# Patient Record
Sex: FEMALE | Race: WHITE | NOT HISPANIC OR LATINO | Employment: STUDENT | URBAN - NONMETROPOLITAN AREA
[De-identification: names, ages, dates, MRNs, and addresses within clinical notes are randomized per-mention and may not be internally consistent; named-entity substitution may affect disease eponyms.]

---
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
## 2020-04-06 ENCOUNTER — TELEPHONE (OUTPATIENT)
Dept: NEUROLOGY | Facility: CLINIC | Age: 20
End: 2020-04-06

## 2020-04-06 ENCOUNTER — TELEMEDICINE (OUTPATIENT)
Dept: INTERNAL MEDICINE | Facility: CLINIC | Age: 20
End: 2020-04-06

## 2020-04-06 DIAGNOSIS — R20.0 BILATERAL LEG NUMBNESS: Primary | ICD-10-CM

## 2020-04-06 DIAGNOSIS — G93.9 WHITE MATTER LESION OF CENTRAL NERVOUS SYSTEM: ICD-10-CM

## 2020-04-06 DIAGNOSIS — G95.9 SPINAL CORD LESION (HCC): ICD-10-CM

## 2020-04-06 DIAGNOSIS — R51.9 NEW ONSET OF HEADACHES: ICD-10-CM

## 2020-04-06 PROBLEM — N80.9 ENDOMETRIOSIS: Status: ACTIVE | Noted: 2020-04-06

## 2020-04-06 PROBLEM — E28.2 PCO (POLYCYSTIC OVARIES): Status: ACTIVE | Noted: 2020-04-06

## 2020-04-06 PROCEDURE — 99214 OFFICE O/P EST MOD 30 MIN: CPT | Performed by: PHYSICIAN ASSISTANT

## 2020-04-06 RX ORDER — HYDROCORTISONE ACETATE SUPPOSITORY 30 MG/1
SUPPOSITORY RECTAL
COMMUNITY
Start: 2020-01-30 | End: 2020-11-30

## 2020-04-06 RX ORDER — SUMATRIPTAN 50 MG/1
TABLET, FILM COATED ORAL
Qty: 9 TABLET | Refills: 1 | Status: SHIPPED | OUTPATIENT
Start: 2020-04-06 | End: 2020-11-12

## 2020-04-06 RX ORDER — PROPRANOLOL HYDROCHLORIDE 10 MG/1
TABLET ORAL
COMMUNITY
Start: 2020-01-25 | End: 2021-07-14 | Stop reason: SDUPTHER

## 2020-04-06 RX ORDER — TRAZODONE HYDROCHLORIDE 100 MG/1
100 TABLET ORAL
COMMUNITY
Start: 2020-01-25 | End: 2020-11-30

## 2020-04-06 RX ORDER — ONDANSETRON 8 MG/1
8 TABLET, ORALLY DISINTEGRATING ORAL EVERY 8 HOURS PRN
Qty: 12 TABLET | Refills: 1 | Status: SHIPPED | OUTPATIENT
Start: 2020-04-06 | End: 2020-11-30

## 2020-04-06 RX ORDER — LORAZEPAM 1 MG/1
TABLET ORAL
COMMUNITY
Start: 2020-01-08 | End: 2020-05-19

## 2020-04-06 NOTE — PATIENT INSTRUCTIONS
An urgent referral to neurology was ordered today.  If you have not heard from the neurology office within 4 business days please notify me.  I have sent in a prescription for Imitrex which is a migraine abortive medication which you may try to see if it will help headaches when they occur.  He may continue ibuprofen or Excedrin as needed for headaches.  I have also sent in a prescription for Zofran to help with nausea when headaches occur.    If any symptoms worsen you should return to the ER for further evaluation.

## 2020-04-06 NOTE — PROGRESS NOTES
Trisha Payne is a 20 y.o. female.     Subjective   History of Present Illness   Patient presents today with concern of new onset severe headaches.  She notes that she wakes up around 5 days/week with an intense headache which causes vomiting.  These headaches last anywhere from 2 to 7 hours before resolving.  With the headaches she has associated light sensitivity.  With the headaches she has pain from both shoulders up the back of her neck and head with intense pressure behind both eyes.  Movement exacerbates the pain.  She has tried taking ibuprofen and Excedrin for symptoms but notes that only Excedrin extra strength dulls the headache somewhat.  Vomiting sometimes helps resolve the headaches but sometimes causes worsened pain.  As noted below, she had been experiencing fairly persistent bilateral leg numbness which she reports improved significantly around 2 months ago and now only occurs on occasion to a more mild extent.    On 12/9/2019 she presented to  ED with complaint of bilateral leg numbness and tingling which began around 2-3 weeks prior with progressive worsening.  The numbness and tingling are bothersome from the feet to the hips and in a sending fashion without any motor weakness.  In the ER she was noted to have decreased sensation from the hips to the feet bilaterally.  Otherwise, neurological exam was normal.  CBC and BMP in the ER were unremarkable.  In the ER she was evaluated by neurology where it was again noted that she had decreased sensation in bilateral lower extremities.  She was already scheduled for a follow-up with [facility], therefore was deemed appropriate for discharge to home with close outpatient follow-up.      On 12/26/2019 she was seen by outpatient neurology where she described similar symptoms ongoing at that time for around 1 month with eventual a sending pattern up to above the umbilicus.  She continued to deny any motor, bowel or bladder  "symptoms, palpitations, anhidrosis or hyperhidrosis, or breathing problems.  She also denied any recent history of fever, vaccinations, trauma, skin rashes, recent travel or any other reported contributing symptoms/history.  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  She was again noted to have abnormal sensation to light touch and pinprick predominantly in the L5-S1 distribution as well as abnormality to light touch that seems to extend around the T7 level.  She also had symptoms consistent with allodynia.  Differential diagnosis from this encounter included multiple sclerosis/NMOSD.  MRI brain and cervical spine with and without contrast were ordered.  Lumbar puncture was discussed but declined by the patient at that time.  If MRI brain and cervical spine are negative they plan to proceed with CT abdomen and pelvis as well as CT chest to screen for sarcoidosis.  A lumbar puncture may be needed if all are negative.  Labs were also ordered.     -MRI thoracic spine showed: \"Hyperdense lesion in the T7/8 area and is possibly located centrally raising suspicion for a short segment NMOSD\".   -MRI brain performed on 1/9/2020 showed: \"Tiny T2 hyperdense lesion in the periventricular white matter adjacent to the body of the right lateral ventricle.  This may be due to reactive change from an adjacent developmental venous anomaly, although a demyelinating lesion is possible.  However, no additional findings are present to suggest demyelinating disease.\"  -MRI cervical spine performed on 1/9/2020 showed: \" Normal cervical spine\".  -Lab work-up obtained thus far has been unremarkable.     She was last told by the MS specialist at  that they would repeat MRIs in around 6 months to assess for stability.  She did not feel as though that specialist was sent to " her and did not agree with their plan and requests referral to a neurologist within the [facility] for second opinion.        The following portions of the patient's history were reviewed and updated as appropriate: allergies, current medications, past family history, past medical history, past social history, past surgical history and problem list.    Review of Systems   Constitutional: Negative for activity change, appetite change, chills, diaphoresis, fatigue, fever and unexpected weight change.   Eyes: Negative for redness and visual disturbance.   Respiratory: Negative for cough, chest tightness, shortness of breath and wheezing.    Cardiovascular: Negative for chest pain, palpitations and leg swelling.   Gastrointestinal: Positive for nausea and vomiting. Negative for abdominal distention, abdominal pain, blood in stool, constipation, diarrhea and rectal pain.   Endocrine: Negative for cold intolerance, heat intolerance, polydipsia, polyphagia and polyuria.   Genitourinary: Negative.    Musculoskeletal: Positive for myalgias and neck pain. Negative for arthralgias, gait problem, joint swelling and neck stiffness.   Skin: Negative for color change and rash.   Allergic/Immunologic: Negative for immunocompromised state.   Neurological: Positive for numbness and headaches. Negative for dizziness, syncope, facial asymmetry, speech difficulty and weakness.   Psychiatric/Behavioral: Positive for sleep disturbance (headaches). Negative for agitation, confusion, dysphoric mood, self-injury and suicidal ideas. The patient is not nervous/anxious and is not hyperactive.    All other systems reviewed and are negative.        Objective    Physical Exam   Constitutional: She is oriented to person, place, and time. She appears well-developed and well-nourished. No distress.   HENT:   Head: Normocephalic and atraumatic.   Pulmonary/Chest: Effort normal.   Neurological: She is alert and oriented to person, place, and  time. Coordination normal.   Skin: She is not diaphoretic.   Psychiatric: She has a normal mood and affect. Her behavior is normal. Judgment and thought content normal.         There were no vitals taken for this visit.    Nursing note and vitals reviewed.          Assessment/Plan   Trisha was seen today for headache.    Diagnoses and all orders for this visit:    Bilateral leg numbness  -     Ambulatory Referral to Neurology    New onset of headaches  -     Ambulatory Referral to Neurology  -     SUMAtriptan (Imitrex) 50 MG tablet; Take one tablet at onset of headache. May repeat dose one time in 2 hours if headache not relieved.  -     ondansetron ODT (Zofran ODT) 8 MG disintegrating tablet; Place 1 tablet on the tongue Every 8 (Eight) Hours As Needed for Nausea or Vomiting.    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  If you have not heard from the neurology office within 4 business days please notify me.  I have sent in a prescription for Imitrex which is a migraine abortive medication which you may try to see if it will help headaches when they occur.  He may continue ibuprofen or Excedrin as needed for headaches.  I have also sent in a prescription for Zofran to help with nausea when headaches occur.  If any symptoms worsen you should return to the ER for further evaluation.        ER record and neurology records reviewed.        ALESHIA Walls  04/06/2020  8:53 AM

## 2020-04-06 NOTE — TELEPHONE ENCOUNTER
is scheduled to see  on 5/19/2020 she is wondering if she may do a [employer] video visit so that she may see  sooner.

## 2020-05-19 ENCOUNTER — TELEPHONE (OUTPATIENT)
Dept: NEUROLOGY | Facility: CLINIC | Age: 20
End: 2020-05-19

## 2020-05-19 ENCOUNTER — LAB (OUTPATIENT)
Dept: LAB | Facility: HOSPITAL | Age: 20
End: 2020-05-19

## 2020-05-19 ENCOUNTER — OFFICE VISIT (OUTPATIENT)
Dept: NEUROLOGY | Facility: CLINIC | Age: 20
End: 2020-05-19

## 2020-05-19 VITALS
BODY MASS INDEX: 19.81 KG/M2 | WEIGHT: 116 LBS | HEART RATE: 102 BPM | HEIGHT: 64 IN | DIASTOLIC BLOOD PRESSURE: 80 MMHG | SYSTOLIC BLOOD PRESSURE: 123 MMHG | TEMPERATURE: 97.7 F | OXYGEN SATURATION: 98 %

## 2020-05-19 DIAGNOSIS — G37.3 TRANSVERSE MYELITIS (HCC): Primary | ICD-10-CM

## 2020-05-19 DIAGNOSIS — G43.C0 PERIODIC HEADACHE SYNDROME, NOT INTRACTABLE: ICD-10-CM

## 2020-05-19 DIAGNOSIS — G37.3 TRANSVERSE MYELITIS (HCC): ICD-10-CM

## 2020-05-19 PROCEDURE — 86255 FLUORESCENT ANTIBODY SCREEN: CPT

## 2020-05-19 PROCEDURE — 99245 OFF/OP CONSLTJ NEW/EST HI 55: CPT | Performed by: PSYCHIATRY & NEUROLOGY

## 2020-05-19 PROCEDURE — 36415 COLL VENOUS BLD VENIPUNCTURE: CPT

## 2020-05-19 RX ORDER — ALPRAZOLAM 1 MG/1
TABLET ORAL
Qty: 2 TABLET | Refills: 0 | Status: SHIPPED | OUTPATIENT
Start: 2020-05-19 | End: 2020-11-30

## 2020-05-19 RX ORDER — DIVALPROEX SODIUM 500 MG/1
500 TABLET, EXTENDED RELEASE ORAL DAILY
Qty: 30 TABLET | Refills: 5 | Status: SHIPPED | OUTPATIENT
Start: 2020-05-19 | End: 2020-06-09 | Stop reason: SDUPTHER

## 2020-05-19 RX ORDER — ETONOGESTREL AND ETHINYL ESTRADIOL .12; .015 MG/D; MG/D
RING VAGINAL
COMMUNITY
Start: 2020-04-07 | End: 2021-04-08

## 2020-05-19 NOTE — PROGRESS NOTES
Subjective   Patient ID: Trisha Payne is a 20 y.o. female     Chief Complaint   Patient presents with   • Spinal Cord Lesion   • Headache   • Bilateral Leg Numbness   • White Matter Lesion of CNS        History of Present Illness    20 y.o. female referred by Regla Donovan for bilateral LE numbness.  Developed ascending numbness from feet to rib cage over 3 weeks.  Felt slightly clumsy.  Could not feel shoes or pants.  Decreased LT.      Sx resolved by March 2020.  Episodes of N and weakness in LE lasting for a minute at time.  Flexing head or back provokes sx.      Feet developed numbness after being gone for a few weeks.  Recurred with HA.      Migraines    Flashing light in peripheral of OD.  Frequency 8 - 12 a month.  Awakens from sleep.  Last for 5 hours.  Assoc sx of nausea.  Located in forehead and back of nape.  Quality is a tight band.  Started in March 2020.     Taking Sumatriptan but cannot tolerate.       Reviewed medical records:    New onset of B LE numbness starting in 11/2019.  Started in toes and ascended up LE.  L > R sx.  Sensation of walking on sand.  Evaluated at  ED and noted to have decreased sensation from waist down.      My review of films:    MRI thoracic spine - T7/8 cord lesion.    MRI Brain/cervical - 1/9/20 tiny T2 lesion R PVWM probable venous anomaly, no cord lesions      4/6/20 reported HA 5 times a week.  Severe intensity, N, photophobia,     Evaluation by Dr Navarro NMO, SSA/SSB, HIV, RPR, ACE, ANCA neg  Recommended monitoring for second demyelinating lesion.    Started Lyrica. Answers for HPI/ROS submitted by the patient on 5/17/2020   Neurologic complaint  What is the primary reason for your visit?: Neurological Problem      Past Medical History:   Diagnosis Date   • Anemia    • Anxiety    • Bipolar disorder (CMS/HCC)    • Depression    • Ovarian cyst      Family History   Problem Relation Age of Onset   • Hypertension Paternal Grandfather    • Thyroid cancer  Maternal Grandmother    • Mental illness Mother    • Migraines Mother    • Diabetes Father      Social History     Socioeconomic History   • Marital status: Single     Spouse name: Not on file   • Number of children: Not on file   • Years of education: Not on file   • Highest education level: Not on file   Tobacco Use   • Smoking status: Never Smoker   • Smokeless tobacco: Never Used   Substance and Sexual Activity   • Alcohol use: No   • Drug use: No   • Sexual activity: Yes     Partners: Male     Birth control/protection: Condom       Review of Systems   Constitutional: Positive for fatigue. Negative for activity change, diaphoresis, fever and unexpected weight change.   HENT: Negative for tinnitus and trouble swallowing.    Eyes: Negative for photophobia and visual disturbance.   Respiratory: Positive for shortness of breath. Negative for apnea, cough and choking.    Cardiovascular: Positive for chest pain. Negative for palpitations and leg swelling.   Gastrointestinal: Positive for nausea and vomiting. Negative for abdominal pain.   Endocrine: Negative for cold intolerance and heat intolerance.   Genitourinary: Negative for difficulty urinating, frequency, menstrual problem and urgency.   Musculoskeletal: Positive for back pain and neck pain. Negative for gait problem and myalgias.   Skin: Negative for color change and rash.   Allergic/Immunologic: Negative for immunocompromised state.   Neurological: Positive for dizziness, weakness, light-headedness and headaches. Negative for tremors, seizures, syncope, facial asymmetry, speech difficulty and numbness.   Hematological: Negative for adenopathy. Does not bruise/bleed easily.   Psychiatric/Behavioral: Positive for confusion. Negative for behavioral problems, decreased concentration, hallucinations and sleep disturbance.       Objective     Vitals:    05/19/20 1058   BP: 123/80   Pulse: 102   Temp: 97.7 °F (36.5 °C)   SpO2: 98%   Weight: 52.6 kg (116 lb)  "  Height: 162.6 cm (64\")       Neurologic Exam     Mental Status   Oriented to person, place, and time.   Registration: recalls 3 of 3 objects. Recall at 5 minutes: recalls 3 of 3 objects. Follows 3 step commands.   Attention: normal. Concentration: normal.   Speech: speech is normal   Level of consciousness: alert  Knowledge: good and consistent with education.   Able to name object. Able to read. Able to repeat. Able to write. Normal comprehension.     Cranial Nerves     CN II   Visual fields full to confrontation.   Visual acuity: normal  Right visual field deficit: none  Left visual field deficit: none     CN III, IV, VI   Pupils are equal, round, and reactive to light.  Extraocular motions are normal.   Right pupil: Shape: regular. Reactivity: brisk. Consensual response: intact.   Left pupil: Shape: regular. Reactivity: brisk. Consensual response: intact.   Nystagmus: none   Diplopia: none  Ophthalmoparesis: none  Upgaze: normal  Downgaze: normal  Conjugate gaze: present  Vestibulo-ocular reflex: present    CN V   Facial sensation intact.   Right corneal reflex: normal  Left corneal reflex: normal    CN VII   Right facial weakness: none  Left facial weakness: none    CN VIII   Hearing: intact    CN IX, X   Palate: symmetric  Right gag reflex: normal  Left gag reflex: normal    CN XI   Right sternocleidomastoid strength: normal  Left sternocleidomastoid strength: normal    CN XII   Tongue: not atrophic  Fasciculations: absent  Tongue deviation: none    Motor Exam   Muscle bulk: normal  Overall muscle tone: normal  Right arm tone: normal  Left arm tone: normal  Right leg tone: normal  Left leg tone: normal    Strength   Strength 5/5 throughout.     Sensory Exam   Right arm light touch: normal  Left arm light touch: normal  Right leg light touch: decreased from knee  Left leg light touch: decreased from knee  Vibration normal.   Right arm proprioception: normal  Left arm proprioception: normal  Right leg " proprioception: decreased from knee  Left leg proprioception: decreased from knee  Right arm pinprick: normal  Left arm pinprick: normal  Right leg pinprick: decreased from knee  Left leg pinprick: decreased from knee    Gait, Coordination, and Reflexes     Gait  Gait: normal    Coordination   Romberg: negative  Finger to nose coordination: normal  Heel to shin coordination: normal  Tandem walking coordination: normal    Tremor   Resting tremor: absent  Intention tremor: absent  Action tremor: absent    Reflexes   Reflexes 2+ except as noted.       Physical Exam   Constitutional: She is oriented to person, place, and time. Vital signs are normal. She appears well-developed and well-nourished.   HENT:   Head: Normocephalic and atraumatic.   Eyes: Pupils are equal, round, and reactive to light. EOM and lids are normal.   Fundoscopic exam:       The right eye shows no exudate, no hemorrhage and no papilledema. The right eye shows venous pulsations.        The left eye shows no exudate, no hemorrhage and no papilledema. The left eye shows venous pulsations.   Neck: Normal range of motion and phonation normal. Normal carotid pulses present. Carotid bruit is not present. No thyroid mass and no thyromegaly present.   Cardiovascular: Normal rate, regular rhythm and normal heart sounds.   Pulmonary/Chest: Effort normal.   Neurological: She is oriented to person, place, and time. She has normal strength. She has a normal Finger-Nose-Finger Test, a normal Heel to Shin Test, a normal Romberg Test and a normal Tandem Gait Test. Gait normal.   Skin: Skin is warm and dry.   Psychiatric: She has a normal mood and affect. Her speech is normal.   Nursing note and vitals reviewed.      Admission on 06/23/2019, Discharged on 06/23/2019   Component Date Value Ref Range Status   • Color 06/23/2019 Yellow  Yellow, Straw, Dark Yellow, [place] Final   • Clarity, UA 06/23/2019 Cloudy* Clear Final   • Glucose, UA 06/23/2019 Negative  Negative,  1000 mg/dL (3+) mg/dL Final   • Bilirubin 06/23/2019 Negative  Negative Final   • Ketones, UA 06/23/2019 Negative  Negative Final   • Specific Gravity  06/23/2019 1.025  1.005 - 1.030 Final   • Blood, UA 06/23/2019 3+* Negative Final   • pH, Urine 06/23/2019 6.0  5.0 - 8.0 Final   • Protein, POC 06/23/2019 2+* Negative mg/dL Final   • Urobilinogen, UA 06/23/2019 Normal  Normal Final   • Nitrite, UA 06/23/2019 Positive* Negative Final   • Leukocytes 06/23/2019 Small (1+)* Negative Final         Assessment/Plan     Problem List Items Addressed This Visit        Cardiovascular and Mediastinum    Periodic headache syndrome, not intractable    Current Assessment & Plan     Headaches are worsening.  Medication changes per orders.     Start Depakote              Relevant Medications    propranolol (INDERAL) 10 MG tablet    traZODone (DESYREL) 100 MG tablet    SUMAtriptan (Imitrex) 50 MG tablet    divalproex (DEPAKOTE) 500 MG 24 hr tablet       Nervous and Auditory    Transverse myelitis (CMS/HCC) - Primary    Current Assessment & Plan     Waxing and waning of numbness in LE below knees    Continued to monitor B/C/T     MOG ab           Relevant Orders    MOG FACS, SERUM    MRI Brain With & Without Contrast    MRI Cervical Spine With & Without Contrast    MRI Thoracic Spine With & Without Contrast             No follow-ups on file.

## 2020-05-19 NOTE — TELEPHONE ENCOUNTER
PT CALLED IN AND STATED SHE WILL NEED [facility] PRESCRIBED TO HER TO GET THROUGH HER MRI'S ON June 3RD SHE SAID THEY DISCUSSED IN THE LAST OFFICE VISIT AND WOULD LIKE TO HAVE THEM FOR HER MRI'S.  SHE SAID THEY CAN BE SENT TO THE [facility] IN [place] WITH PHONE NUMBER [phone]

## 2020-05-27 LAB — MOG FACS, S: NEGATIVE

## 2020-06-03 ENCOUNTER — HOSPITAL ENCOUNTER (OUTPATIENT)
Dept: MRI IMAGING | Facility: HOSPITAL | Age: 20
Discharge: HOME | End: 2020-06-03
Admitting: PSYCHIATRY & NEUROLOGY

## 2020-06-03 ENCOUNTER — HOSPITAL ENCOUNTER (OUTPATIENT)
Dept: MRI IMAGING | Facility: HOSPITAL | Age: 20
Discharge: HOME | End: 2020-06-03

## 2020-06-03 DIAGNOSIS — G37.3 TRANSVERSE MYELITIS (HCC): ICD-10-CM

## 2020-06-03 PROCEDURE — A9577 INJ MULTIHANCE: HCPCS | Performed by: PSYCHIATRY & NEUROLOGY

## 2020-06-03 PROCEDURE — 70553 MRI BRAIN STEM W/O & W/DYE: CPT

## 2020-06-03 PROCEDURE — 72156 MRI NECK SPINE W/O & W/DYE: CPT

## 2020-06-03 PROCEDURE — 72157 MRI CHEST SPINE W/O & W/DYE: CPT

## 2020-06-03 PROCEDURE — 0 GADOBENATE DIMEGLUMINE 529 MG/ML SOLUTION: Performed by: PSYCHIATRY & NEUROLOGY

## 2020-06-03 RX ADMIN — GADOBENATE DIMEGLUMINE 10 ML: 529 INJECTION, SOLUTION INTRAVENOUS at 15:37

## 2020-06-09 ENCOUNTER — OFFICE VISIT (OUTPATIENT)
Dept: NEUROLOGY | Facility: CLINIC | Age: 20
End: 2020-06-09

## 2020-06-09 VITALS
SYSTOLIC BLOOD PRESSURE: 126 MMHG | WEIGHT: 115 LBS | TEMPERATURE: 98.6 F | DIASTOLIC BLOOD PRESSURE: 80 MMHG | HEIGHT: 64 IN | HEART RATE: 118 BPM | OXYGEN SATURATION: 98 % | BODY MASS INDEX: 19.63 KG/M2

## 2020-06-09 DIAGNOSIS — G43.C0 PERIODIC HEADACHE SYNDROME, NOT INTRACTABLE: ICD-10-CM

## 2020-06-09 DIAGNOSIS — G37.3 TRANSVERSE MYELITIS (HCC): Primary | ICD-10-CM

## 2020-06-09 DIAGNOSIS — R21 RASH: ICD-10-CM

## 2020-06-09 PROCEDURE — 99214 OFFICE O/P EST MOD 30 MIN: CPT | Performed by: PSYCHIATRY & NEUROLOGY

## 2020-06-09 RX ORDER — DIVALPROEX SODIUM 500 MG/1
1000 TABLET, EXTENDED RELEASE ORAL DAILY
Qty: 60 TABLET | Refills: 5 | Status: SHIPPED | OUTPATIENT
Start: 2020-06-09 | End: 2020-07-09

## 2020-06-09 NOTE — PROGRESS NOTES
Subjective   Patient ID: Trisha Payne is a 20 y.o. female     Chief Complaint   Patient presents with   • Transverse Myelitis     Follow up         History of Present Illness    20 y.o. female returns in follow for bilateral LE numbness, migraines.  Last visit on 5/19/20 ordered MRI B/C/T, ordered MOG ab, started Depakote and Zofran.    MRI B/C/T, my review of films, brain one left PVWM T2 lesion, no cervical cord lesions, T7 cord lesion, no enhancement, small thoracic meningioma     MOG - neg    Residual sx of numbness from balls of feet distally.     Problem history:    Developed ascending numbness from feet to rib cage over 3 weeks. Felt slightly clumsy.  Could not feel shoes or pants.  Decreased LT.      Sx resolved by March 2020.  Episodes of N and weakness in LE lasting for a minute at time.  Flexing head or back provokes sx.      Feet developed numbness after being gone for a few weeks.  Recurred with HA.      Migraines    Flashing light in peripheral of OD resolved.  Frequency 5 -7 times a week.   Moderate intensity.  Last for 1 - 1.5 hours.  Located in forehead and back of nape.  Quality is a dull ache.  Started in March 2020.     Taking Sumatriptan but cannot tolerate.       Reviewed medical records:    New onset of B LE numbness starting in 11/2019.  Started in toes and ascended up LE.  L > R sx.  Sensation of walking on sand.  Evaluated at  ED and noted to have decreased sensation from waist down.      My review of films:    MRI thoracic spine - T7/8 cord lesion.    MRI Brain/cervical - 1/9/20 tiny T2 lesion R PVWM probable venous anomaly, no cord lesions      4/6/20 reported HA 5 times a week.  Severe intensity, N, photophobia,     Evaluation by Dr Navarro NMO, SSA/SSB, HIV, RPR, ACE, ANCA neg  Recommended monitoring for second demyelinating lesion.    Started Lyrica. Answers for HPI/ROS submitted by the patient on 5/17/2020   Neurologic complaint  What is the primary reason for your visit?:  Neurological Problem      Past Medical History:   Diagnosis Date   • Anemia    • Anxiety    • Bipolar disorder (CMS/HCC)    • Depression    • Ovarian cyst      Family History   Problem Relation Age of Onset   • Hypertension Paternal Grandfather    • Thyroid cancer Maternal Grandmother    • Mental illness Mother    • Migraines Mother    • Diabetes Father      Social History     Socioeconomic History   • Marital status: Single     Spouse name: Not on file   • Number of children: Not on file   • Years of education: Not on file   • Highest education level: Not on file   Tobacco Use   • Smoking status: Never Smoker   • Smokeless tobacco: Never Used   Substance and Sexual Activity   • Alcohol use: No   • Drug use: No   • Sexual activity: Yes     Partners: Male     Birth control/protection: Condom       Review of Systems   Constitutional: Positive for fatigue. Negative for activity change, diaphoresis, fever and unexpected weight change.   HENT: Negative for tinnitus and trouble swallowing.    Eyes: Negative for photophobia and visual disturbance.   Respiratory: Positive for shortness of breath. Negative for apnea, cough and choking.    Cardiovascular: Positive for chest pain. Negative for palpitations and leg swelling.   Gastrointestinal: Positive for nausea and vomiting. Negative for abdominal pain.   Endocrine: Negative for cold intolerance and heat intolerance.   Genitourinary: Negative for difficulty urinating, frequency, menstrual problem and urgency.   Musculoskeletal: Positive for back pain and neck pain. Negative for gait problem and myalgias.   Skin: Negative for color change and rash.   Allergic/Immunologic: Negative for immunocompromised state.   Neurological: Positive for dizziness, weakness, light-headedness and headaches. Negative for tremors, seizures, syncope, facial asymmetry, speech difficulty and numbness.   Hematological: Negative for adenopathy. Does not bruise/bleed easily.   Psychiatric/Behavioral:  "Positive for confusion. Negative for behavioral problems, decreased concentration, hallucinations and sleep disturbance.       Objective     Vitals:    06/09/20 1445   BP: 126/80   Pulse: 118   Temp: 98.6 °F (37 °C)   SpO2: 98%   Weight: 52.2 kg (115 lb)   Height: 162.6 cm (64\")       Neurologic Exam     Mental Status   Oriented to person, place, and time.   Registration: recalls 3 of 3 objects. Recall at 5 minutes: recalls 3 of 3 objects. Follows 3 step commands.   Attention: normal. Concentration: normal.   Speech: speech is normal   Level of consciousness: alert  Knowledge: good and consistent with education.   Able to name object. Able to read. Able to repeat. Able to write. Normal comprehension.     Cranial Nerves     CN II   Visual fields full to confrontation.   Visual acuity: normal  Right visual field deficit: none  Left visual field deficit: none     CN III, IV, VI   Pupils are equal, round, and reactive to light.  Extraocular motions are normal.   Nystagmus: none   Diplopia: none  Ophthalmoparesis: none  Upgaze: normal  Downgaze: normal  Conjugate gaze: present  Vestibulo-ocular reflex: present    CN V   Facial sensation intact.   Right corneal reflex: normal  Left corneal reflex: normal    CN VII   Right facial weakness: none  Left facial weakness: none    CN VIII   Hearing: intact    CN IX, X   Palate: symmetric  Right gag reflex: normal  Left gag reflex: normal    CN XI   Right sternocleidomastoid strength: normal  Left sternocleidomastoid strength: normal    CN XII   Tongue: not atrophic  Fasciculations: absent  Tongue deviation: none    Motor Exam   Muscle bulk: normal  Overall muscle tone: normal  Right arm tone: normal  Left arm tone: normal  Right leg tone: normal  Left leg tone: normal    Strength   Strength 5/5 throughout.     Sensory Exam   Right arm light touch: normal  Left arm light touch: normal  Right leg light touch: decreased from toes  Left leg light touch: decreased from toes  Right " arm vibration: normal  Left arm vibration: normal  Right leg vibration: decreased from toes  Left leg vibration: decreased from toes  Right arm proprioception: normal  Left arm proprioception: normal  Right leg proprioception: decreased from toes  Left leg proprioception: decreased from toes  Right arm pinprick: normal  Left arm pinprick: normal  Right leg pinprick: decreased from toes  Left leg pinprick: decreased from toes    Gait, Coordination, and Reflexes     Gait  Gait: normal    Coordination   Romberg: negative  Finger to nose coordination: normal  Heel to shin coordination: normal  Tandem walking coordination: normal    Tremor   Resting tremor: absent  Intention tremor: absent  Action tremor: absent    Reflexes   Reflexes 2+ except as noted.       Physical Exam   Constitutional: She is oriented to person, place, and time. She appears well-developed and well-nourished.   Eyes: Pupils are equal, round, and reactive to light. EOM are normal.   Neurological: She is oriented to person, place, and time. She has normal strength. She has a normal Finger-Nose-Finger Test, a normal Heel to Shin Test, a normal Romberg Test and a normal Tandem Gait Test. Gait normal.   Psychiatric: Her speech is normal.   Nursing note and vitals reviewed.      Lab on 05/19/2020   Component Date Value Ref Range Status   • MOG FACS, S 05/19/2020 Negative  Negative Final    No informative autoantibodies were detected in this  evaluation. A negative result does not preclude a diagnosis  of an inflammatory CNS demyelinating disorder.  -------------------ADDITIONAL INFORMATION-------------------  This test was developed and its performance characteristics  determined by [facility] in a manner consistent with CLIA  requirements. This test has not been cleared or approved by  the U.S. Food and Drug Administration.         Assessment/Plan     Problem List Items Addressed This Visit        Cardiovascular and Mediastinum    Periodic headache  syndrome, not intractable    Current Assessment & Plan     Headaches are unchanged.  Medication changes per orders.     Increase Depakote ER 1000 mg qhs              Relevant Medications    propranolol (INDERAL) 10 MG tablet    traZODone (DESYREL) 100 MG tablet    SUMAtriptan (Imitrex) 50 MG tablet    divalproex (DEPAKOTE) 500 MG 24 hr tablet       Nervous and Auditory    Transverse myelitis (CMS/HCC) - Primary    Current Assessment & Plan     No evidence of recurrence on MRI B/C/T     Watchful waiting          Relevant Medications    ALPRAZolam (Xanax) 1 MG tablet    Other Relevant Orders    Ambulatory Referral to Dermatology      Other Visit Diagnoses     Rash        Relevant Orders    Ambulatory Referral to Dermatology             No follow-ups on file.

## 2020-06-09 NOTE — ASSESSMENT & PLAN NOTE
Headaches are unchanged.  Medication changes per orders.     Increase Depakote ER 1000 mg qhs

## 2020-11-12 ENCOUNTER — OFFICE VISIT (OUTPATIENT)
Dept: NEUROLOGY | Facility: CLINIC | Age: 20
End: 2020-11-12

## 2020-11-12 ENCOUNTER — LAB (OUTPATIENT)
Dept: LAB | Facility: HOSPITAL | Age: 20
End: 2020-11-12

## 2020-11-12 VITALS — WEIGHT: 115.2 LBS | HEIGHT: 64 IN | TEMPERATURE: 97.3 F | BODY MASS INDEX: 19.67 KG/M2 | OXYGEN SATURATION: 98 %

## 2020-11-12 DIAGNOSIS — G37.3 TRANSVERSE MYELITIS (HCC): ICD-10-CM

## 2020-11-12 DIAGNOSIS — G43.C0 PERIODIC HEADACHE SYNDROME, NOT INTRACTABLE: Primary | ICD-10-CM

## 2020-11-12 PROCEDURE — 85652 RBC SED RATE AUTOMATED: CPT

## 2020-11-12 PROCEDURE — 36415 COLL VENOUS BLD VENIPUNCTURE: CPT

## 2020-11-12 PROCEDURE — 86255 FLUORESCENT ANTIBODY SCREEN: CPT

## 2020-11-12 PROCEDURE — 86431 RHEUMATOID FACTOR QUANT: CPT

## 2020-11-12 PROCEDURE — 99214 OFFICE O/P EST MOD 30 MIN: CPT | Performed by: PSYCHIATRY & NEUROLOGY

## 2020-11-12 PROCEDURE — 86038 ANTINUCLEAR ANTIBODIES: CPT

## 2020-11-12 RX ORDER — DIVALPROEX SODIUM 500 MG/1
1000 TABLET, EXTENDED RELEASE ORAL
COMMUNITY
Start: 2020-08-23 | End: 2020-11-30

## 2020-11-12 RX ORDER — OXCARBAZEPINE 300 MG/1
300 TABLET, FILM COATED ORAL 2 TIMES DAILY
Qty: 60 TABLET | Refills: 6 | Status: SHIPPED | OUTPATIENT
Start: 2020-11-12 | End: 2020-11-30

## 2020-11-12 RX ORDER — ARMODAFINIL 250 MG/1
250 TABLET ORAL DAILY
Qty: 30 TABLET | Refills: 5 | Status: SHIPPED | OUTPATIENT
Start: 2020-11-12 | End: 2020-11-12 | Stop reason: SDUPTHER

## 2020-11-12 RX ORDER — ALPRAZOLAM 1 MG/1
TABLET ORAL
Qty: 2 TABLET | Refills: 0 | Status: CANCELLED | OUTPATIENT
Start: 2020-11-12

## 2020-11-12 RX ORDER — ARMODAFINIL 250 MG/1
250 TABLET ORAL DAILY
Qty: 30 TABLET | Refills: 5 | Status: SHIPPED | OUTPATIENT
Start: 2020-11-12 | End: 2020-12-12

## 2020-11-12 NOTE — ASSESSMENT & PLAN NOTE
Headaches are improving with treatment.  Continue current treatment regimen.     Continue Depakote ER 1000 mg qhs

## 2020-11-12 NOTE — TELEPHONE ENCOUNTER
Patient picked up Trileptal but not Armodafinil because of cost. Per patient, it is cheaper to get the Armodafinil at requested pharmacy. Please resend.

## 2020-11-12 NOTE — PROGRESS NOTES
Subjective   Patient ID: Trisha Payne is a 20 y.o. female     Chief Complaint   Patient presents with   • Transverse Myelitis     Follow up         History of Present Illness    20 y.o. female returns in follow for bilateral LE numbness, migraines.  Last visit on 6/9/20  increased Depakote, referred to Dermatology.    MRI B/C/T 6/3/20 brain one left PVWM T2 lesion, no cervical cord lesions, T7 cord lesion, no enhancement, small thoracic meningioma     MOG - neg    Hips and back pain continue.  Joint pain in hands.  Hands and feet swell.  Pins and needles in feet.      Fatigue is moderate to severe.      Rash resolved with not going out in sun.      Problem history:    Developed ascending numbness from feet to rib cage over 3 weeks. Felt slightly clumsy.  Could not feel shoes or pants.  Decreased LT.      Sx resolved by March 2020.  Episodes of N and weakness in LE lasting for a minute at time.  Flexing head or back provokes sx.      Feet developed numbness after being gone for a few weeks.  Recurred with HA.      Migraines    Flashing light in peripheral of OD.  Frequency once every two weeks.   Moderate intensity.  Last for 1 - 1.5 hours.  Located in band around head.  Quality is a dull ache.      Taking Sumatriptan but cannot tolerate.       Reviewed medical records:    New onset of B LE numbness starting in 11/2019.  Started in toes and ascended up LE.  L > R sx.  Sensation of walking on sand.  Evaluated at  ED and noted to have decreased sensation from waist down.      My review of films:    MRI thoracic spine - T7/8 cord lesion.    MRI Brain/cervical - 1/9/20 tiny T2 lesion R PVWM probable venous anomaly, no cord lesions      4/6/20 reported HA 5 times a week.  Severe intensity, N, photophobia,     Evaluation by Dr Navarro NMO, SSA/SSB, HIV, RPR, ACE, ANCA neg  Recommended monitoring for second demyelinating lesion.    Started Lyrica. Answers for HPI/ROS submitted by the patient on 5/17/2020    Neurologic complaint  What is the primary reason for your visit?: Neurological Problem      Past Medical History:   Diagnosis Date   • Anemia    • Anxiety    • Bipolar disorder (CMS/HCC)    • Depression    • Ovarian cyst      Family History   Problem Relation Age of Onset   • Hypertension Paternal Grandfather    • Thyroid cancer Maternal Grandmother    • Mental illness Mother    • Migraines Mother    • Diabetes Father      Social History     Socioeconomic History   • Marital status: Single     Spouse name: Not on file   • Number of children: Not on file   • Years of education: Not on file   • Highest education level: Not on file   Tobacco Use   • Smoking status: Never Smoker   • Smokeless tobacco: Never Used   Substance and Sexual Activity   • Alcohol use: No   • Drug use: No   • Sexual activity: Yes     Partners: Male     Birth control/protection: Condom       Review of Systems   Constitutional: Positive for fatigue. Negative for activity change, diaphoresis, fever and unexpected weight change.   HENT: Negative for tinnitus and trouble swallowing.    Eyes: Negative for photophobia and visual disturbance.   Respiratory: Positive for shortness of breath. Negative for apnea, cough and choking.    Cardiovascular: Positive for chest pain. Negative for palpitations and leg swelling.   Gastrointestinal: Positive for nausea and vomiting. Negative for abdominal pain.   Endocrine: Negative for cold intolerance and heat intolerance.   Genitourinary: Negative for difficulty urinating, frequency, menstrual problem and urgency.   Musculoskeletal: Positive for back pain and neck pain. Negative for gait problem and myalgias.   Skin: Negative for color change and rash.   Allergic/Immunologic: Negative for immunocompromised state.   Neurological: Positive for dizziness, weakness, light-headedness and headaches. Negative for tremors, seizures, syncope, facial asymmetry, speech difficulty and numbness.   Hematological: Negative for  "adenopathy. Does not bruise/bleed easily.   Psychiatric/Behavioral: Positive for confusion. Negative for behavioral problems, decreased concentration, hallucinations and sleep disturbance.       Objective     Vitals:    11/12/20 1008   Temp: 97.3 °F (36.3 °C)   SpO2: 98%   Weight: 52.3 kg (115 lb 3.2 oz)   Height: 162.6 cm (64\")       Neurologic Exam     Mental Status   Oriented to person, place, and time.   Registration: recalls 3 of 3 objects. Recall at 5 minutes: recalls 3 of 3 objects. Follows 3 step commands.   Attention: normal. Concentration: normal.   Speech: speech is normal   Level of consciousness: alert  Knowledge: good and consistent with education.   Able to name object. Able to read. Able to repeat. Able to write. Normal comprehension.     Cranial Nerves     CN II   Visual fields full to confrontation.   Visual acuity: normal  Right visual field deficit: none  Left visual field deficit: none     CN III, IV, VI   Pupils are equal, round, and reactive to light.  Extraocular motions are normal.   Nystagmus: none   Diplopia: none  Ophthalmoparesis: none  Upgaze: normal  Downgaze: normal  Conjugate gaze: present  Vestibulo-ocular reflex: present    CN V   Facial sensation intact.   Right corneal reflex: normal  Left corneal reflex: normal    CN VII   Right facial weakness: none  Left facial weakness: none    CN VIII   Hearing: intact    CN IX, X   Palate: symmetric  Right gag reflex: normal  Left gag reflex: normal    CN XI   Right sternocleidomastoid strength: normal  Left sternocleidomastoid strength: normal    CN XII   Tongue: not atrophic  Fasciculations: absent  Tongue deviation: none    Motor Exam   Muscle bulk: normal  Overall muscle tone: normal  Right arm tone: normal  Left arm tone: normal  Right leg tone: normal  Left leg tone: normal    Strength   Strength 5/5 throughout.     Sensory Exam   Right arm light touch: normal  Left arm light touch: normal  Right leg light touch: decreased from " toes  Left leg light touch: decreased from toes  Right arm vibration: normal  Left arm vibration: normal  Right leg vibration: decreased from toes  Left leg vibration: decreased from toes  Right arm proprioception: normal  Left arm proprioception: normal  Right leg proprioception: decreased from toes  Left leg proprioception: decreased from toes  Right arm pinprick: normal  Left arm pinprick: normal  Right leg pinprick: decreased from toes  Left leg pinprick: decreased from toes    Gait, Coordination, and Reflexes     Gait  Gait: normal    Coordination   Romberg: negative  Finger to nose coordination: normal  Heel to shin coordination: normal  Tandem walking coordination: normal    Tremor   Resting tremor: absent  Intention tremor: absent  Action tremor: absent    Reflexes   Reflexes 2+ except as noted.       Physical Exam   Constitutional: She is oriented to person, place, and time. She appears well-developed.   Eyes: Pupils are equal, round, and reactive to light. EOM are normal.   Neurological: She is oriented to person, place, and time. She has normal strength. She has a normal Finger-Nose-Finger Test, a normal Heel to Shin Test, a normal Romberg Test and a normal Tandem Gait Test. Gait normal.   Psychiatric: Her speech is normal.   Nursing note and vitals reviewed.      Lab on 05/19/2020   Component Date Value Ref Range Status   • MOG FACS, S 05/19/2020 Negative  Negative Final    No informative autoantibodies were detected in this  evaluation. A negative result does not preclude a diagnosis  of an inflammatory CNS demyelinating disorder.  -------------------ADDITIONAL INFORMATION-------------------  This test was developed and its performance characteristics  determined by [facility] in a manner consistent with CLIA  requirements. This test has not been cleared or approved by  the U.S. Food and Drug Administration.         Assessment/Plan     Problem List Items Addressed This Visit        Cardiovascular and  Mediastinum    Periodic headache syndrome, not intractable - Primary    Current Assessment & Plan     Headaches are improving with treatment.  Continue current treatment regimen.     Continue Depakote ER 1000 mg qhs              Relevant Medications    propranolol (INDERAL) 10 MG tablet    traZODone (DESYREL) 100 MG tablet    divalproex (DEPAKOTE) 500 MG 24 hr tablet    OXcarbazepine (TRILEPTAL) 300 MG tablet       Nervous and Auditory    Transverse myelitis (CMS/HCC)    Current Assessment & Plan     Add  mg BID     Add Nuvigil 250 mg qam for fatigue          Relevant Medications    ALPRAZolam (Xanax) 1 MG tablet    Armodafinil (Nuvigil) 250 MG tablet    Other Relevant Orders    AMELIA by IFA, Reflex 9-biomarkers profile    Rheumatoid Factor    Sedimentation Rate    Neuromyelitis Optica (NMO) Auto Antibody, IgG    MOG FACS, SERUM             No follow-ups on file.

## 2020-11-12 NOTE — TELEPHONE ENCOUNTER
----- Message from Haily Posey sent at 11/12/2020  1:05 PM EST -----  Regarding: MEDICATION  Patient states she would like her Armodafinil 250 mg sent to [facility]    Patient states she picked up Trilleptal 300mg today, but going forward please have this sent to [facility]

## 2020-11-13 LAB
CHROMATIN AB SERPL-ACNC: <10 IU/ML (ref 0–14)
ERYTHROCYTE [SEDIMENTATION RATE] IN BLOOD: 1 MM/HR (ref 0–20)

## 2020-11-16 LAB
ANA TITR SER IF: NEGATIVE {TITER}
LABORATORY COMMENT REPORT: NORMAL

## 2020-11-20 LAB
AQP4 H2O CHANNEL IGG SERPL QL: NEGATIVE
MOG FACS, S: NEGATIVE

## 2020-11-25 ENCOUNTER — TELEPHONE (OUTPATIENT)
Dept: NEUROLOGY | Facility: CLINIC | Age: 20
End: 2020-11-25

## 2020-11-25 RX ORDER — PREDNISONE 10 MG/1
TABLET ORAL
Qty: 18 TABLET | Refills: 0 | Status: SHIPPED | OUTPATIENT
Start: 2020-11-25 | End: 2021-02-25

## 2020-11-25 NOTE — TELEPHONE ENCOUNTER
Patient said rash looks like bug bite. Skin is itching and burning, from head to toe. She is scheduled with Jo on the 30th.   -TMT

## 2020-11-30 ENCOUNTER — TELEPHONE (OUTPATIENT)
Dept: NEUROLOGY | Facility: CLINIC | Age: 20
End: 2020-11-30

## 2020-11-30 ENCOUNTER — OFFICE VISIT (OUTPATIENT)
Dept: NEUROLOGY | Facility: CLINIC | Age: 20
End: 2020-11-30

## 2020-11-30 VITALS
DIASTOLIC BLOOD PRESSURE: 64 MMHG | HEIGHT: 64 IN | HEART RATE: 76 BPM | SYSTOLIC BLOOD PRESSURE: 122 MMHG | BODY MASS INDEX: 19.63 KG/M2 | RESPIRATION RATE: 14 BRPM | TEMPERATURE: 98.6 F | WEIGHT: 115 LBS

## 2020-11-30 DIAGNOSIS — G37.3 TRANSVERSE MYELITIS (HCC): Primary | ICD-10-CM

## 2020-11-30 DIAGNOSIS — G43.C0 PERIODIC HEADACHE SYNDROME, NOT INTRACTABLE: ICD-10-CM

## 2020-11-30 DIAGNOSIS — G37.3 TRANSVERSE MYELITIS (HCC): ICD-10-CM

## 2020-11-30 DIAGNOSIS — M79.2 NEUROPATHIC PAIN: ICD-10-CM

## 2020-11-30 PROCEDURE — 99214 OFFICE O/P EST MOD 30 MIN: CPT | Performed by: NURSE PRACTITIONER

## 2020-11-30 RX ORDER — AMITRIPTYLINE HYDROCHLORIDE 25 MG/1
25 TABLET, FILM COATED ORAL NIGHTLY
Qty: 60 TABLET | Refills: 5 | Status: SHIPPED | OUTPATIENT
Start: 2020-11-30 | End: 2020-12-07

## 2020-11-30 RX ORDER — AMITRIPTYLINE HYDROCHLORIDE 25 MG/1
25 TABLET, FILM COATED ORAL NIGHTLY
Qty: 30 TABLET | Refills: 5 | Status: SHIPPED | OUTPATIENT
Start: 2020-11-30 | End: 2020-11-30

## 2020-11-30 RX ORDER — AMITRIPTYLINE HYDROCHLORIDE 25 MG/1
25 TABLET, FILM COATED ORAL NIGHTLY
Qty: 60 TABLET | Refills: 5 | Status: SHIPPED | OUTPATIENT
Start: 2020-11-30 | End: 2020-11-30 | Stop reason: SDUPTHER

## 2020-11-30 NOTE — TELEPHONE ENCOUNTER
PT CALLED IN TODAY STATING HER ELAVIL 25MG NEEDS TO BE SENT TO [facility]  [facility] [place] [address]. PLEASE RESEND TO CORRECT PHARMACY AS IT WAS PREVIOUSLY SENT TO [facility] UPDATE PHARMACY IN SYSTEM TO [facility] IN [place]. THEIR PHONE NUMBER [phone].        PT ALSO STATES SHE WOULD LIKE TO KNOW IF MODESTO STANLEY COULD REFER HER TO A CHIROPRACTOR AS SHE HAS BACK PAIN, ALONG WITH HIP AND KNEE PAIN. SHE STATES THE BACK PAIN HAS BEEN GOING ON FOR AT LEAST A YEAR AND SHE STATES SHE IS INTERESTED IN BEING SET UP WITH A CHIROPRACTOR. PLEASE ADVISE.        CALL BACK- [phone]

## 2020-11-30 NOTE — ASSESSMENT & PLAN NOTE
Continues with MS Lonnie sensation.     Stopped [facility], instructed to finish the prednisone, rash continuing to improve.     Start Elavil 25 mg PO QHS for 2 weeks, may increase to 50 mg PO QHS thereafter.     F/U in 4 weeks or sooner if needed.

## 2020-11-30 NOTE — ASSESSMENT & PLAN NOTE
Headaches are improving with lifestyle modifications.  Continue current treatment regimen.     Improved, has been off Depakote for some times per patient report.

## 2020-11-30 NOTE — TELEPHONE ENCOUNTER
Medication re-sent.     I do not have a chiropractor that I recommend, she should be able to call any of them and schedule an appointment. I do not think she needs a referral. If she does I would ask her PCP who they recommend.

## 2020-11-30 NOTE — PROGRESS NOTES
Subjective   Patient ID: Trisha Payne is a 20 y.o. female     Chief Complaint   Patient presents with   • Rash        History of Present Illness    20 y.o. female returns in follow for bilateral LE numbness, migraines.  Last visit on 11/12/20 continued Depakote, added  mg PO BID, and Nuvigil ordered labs.     Patient called on 11/25/20 with c/o rash from torso down, instructed to stop OXC, called in prednisone and scheduled for a F/U.    Rash started Saturday the 21st, took the OXC for a week and a few days then developed rash. Stopped the dosage of OXC on 11/25, started the prednisone, itching and rash has decreased over the past few days. Rash now only hyperpigmented dark spots which are fading, itching has decreased significantly.     MRI B/C/T 6/3/20 brain one left PVWM T2 lesion, no cervical cord lesions, T7 cord lesion, no enhancement, small thoracic meningioma     Las: MOG, NMO, AMELIA, Sed rate, RF - negative.     MOG - neg    Hug sensation continues, feels like her chest spasms and she cannot breathe properly. Happened less frequently and less time per episode on the OXC, has had 4 bad episodes since stopping OXC.     Hips and back pain continue.  Joint pain in hands.  Hands and feet swell.  Pins and needles in feet.      Fatigue is moderate to severe.      Rash resolved with not going out in sun.      Problem history:    Developed ascending numbness from feet to rib cage over 3 weeks. Felt slightly clumsy.  Could not feel shoes or pants.  Decreased LT.      Sx resolved by March 2020.  Episodes of N and weakness in LE lasting for a minute at time.  Flexing head or back provokes sx.      Feet developed numbness after being gone for a few weeks.  Recurred with HA.      Migraines  She stopped taking the Depakote due to cloudiness, HA frequency has decreased to once or twice per month. Severity has decreased.     Flashing light in peripheral of OD.  Frequency once every two weeks.   Moderate  intensity.  Last for 1 - 1.5 hours.  Located in band around head.  Quality is a dull ache.      Taking Sumatriptan but cannot tolerate.       Preventatives: Propranolol, OXC, Depakote     Reviewed medical records:    New onset of B LE numbness starting in 11/2019.  Started in toes and ascended up LE.  L > R sx.  Sensation of walking on sand.  Evaluated at  ED and noted to have decreased sensation from waist down.      review of films:    MRI thoracic spine - T7/8 cord lesion.    MRI Brain/cervical - 1/9/20 tiny T2 lesion R PVWM probable venous anomaly, no cord lesions      4/6/20 reported HA 5 times a week.  Severe intensity, N, photophobia,     Evaluation by Dr Navarro NMO, SSA/SSB, HIV, RPR, ACE, ANCA neg  Recommended monitoring for second demyelinating lesion.    Started Lyrica. Answers for HPI/ROS submitted by the patient on 5/17/2020   Neurologic complaint  What is the primary reason for your visit?: Neurological Problem      Past Medical History:   Diagnosis Date   • Anemia    • Anxiety    • Bipolar disorder (CMS/HCC)    • Depression    • Ovarian cyst      Family History   Problem Relation Age of Onset   • Hypertension Paternal Grandfather    • Thyroid cancer Maternal Grandmother    • Mental illness Mother    • Migraines Mother    • Diabetes Father      Social History     Socioeconomic History   • Marital status: Single     Spouse name: Not on file   • Number of children: Not on file   • Years of education: Not on file   • Highest education level: Not on file   Tobacco Use   • Smoking status: Never Smoker   • Smokeless tobacco: Never Used   Substance and Sexual Activity   • Alcohol use: No   • Drug use: No   • Sexual activity: Yes     Partners: Male     Birth control/protection: Condom       Review of Systems   Constitutional: Positive for fatigue. Negative for activity change, diaphoresis, fever and unexpected weight change.   HENT: Negative for tinnitus and trouble swallowing.    Eyes: Negative for  "photophobia and visual disturbance.   Respiratory: Positive for shortness of breath. Negative for apnea, cough and choking.    Cardiovascular: Positive for chest pain. Negative for palpitations and leg swelling.   Gastrointestinal: Positive for nausea and vomiting. Negative for abdominal pain.   Endocrine: Negative for cold intolerance and heat intolerance.   Genitourinary: Negative for difficulty urinating, frequency, menstrual problem and urgency.   Musculoskeletal: Positive for back pain and neck pain. Negative for gait problem and myalgias.   Skin: Negative for color change and rash.   Allergic/Immunologic: Negative for immunocompromised state.   Neurological: Positive for dizziness, weakness, light-headedness and headaches. Negative for tremors, seizures, syncope, facial asymmetry, speech difficulty and numbness.   Hematological: Negative for adenopathy. Does not bruise/bleed easily.   Psychiatric/Behavioral: Positive for confusion. Negative for behavioral problems, decreased concentration, hallucinations and sleep disturbance.       Objective     Vitals:    11/30/20 1100   BP: 122/64   Pulse: 76   Resp: 14   Temp: 98.6 °F (37 °C)   TempSrc: Temporal   Weight: 52.2 kg (115 lb)   Height: 162.6 cm (64\")       Neurologic Exam     Mental Status   Oriented to person, place, and time.   Follows 3 step commands.   Attention: normal. Concentration: normal.   Speech: speech is normal   Level of consciousness: alert  Knowledge: good and consistent with education.   Able to name object. Able to read. Able to repeat. Able to write. Normal comprehension.     Cranial Nerves     CN II   Visual fields full to confrontation.   Visual acuity: normal  Right visual field deficit: none  Left visual field deficit: none     CN III, IV, VI   Pupils are equal, round, and reactive to light.  Extraocular motions are normal.   Nystagmus: none   Diplopia: none  Ophthalmoparesis: none  Upgaze: normal  Downgaze: normal  Conjugate gaze: " present  Vestibulo-ocular reflex: present    CN V   Facial sensation intact.   Right corneal reflex: normal  Left corneal reflex: normal    CN VII   Right facial weakness: none  Left facial weakness: none    CN VIII   Hearing: intact    CN IX, X   Palate: symmetric  Right gag reflex: normal  Left gag reflex: normal    CN XI   Right sternocleidomastoid strength: normal  Left sternocleidomastoid strength: normal    CN XII   Tongue: not atrophic  Fasciculations: absent  Tongue deviation: none    Motor Exam   Muscle bulk: normal  Overall muscle tone: normal  Right arm tone: normal  Left arm tone: normal  Right leg tone: normal  Left leg tone: normal    Strength   Strength 5/5 throughout.     Sensory Exam   Right arm light touch: normal  Left arm light touch: normal  Right leg light touch: decreased from toes  Left leg light touch: decreased from toes    Gait, Coordination, and Reflexes     Gait  Gait: normal    Coordination   Tandem walking coordination: normal    Tremor   Resting tremor: absent  Intention tremor: absent  Action tremor: absent    Reflexes   Reflexes 2+ except as noted.       Physical Exam   Constitutional: She is oriented to person, place, and time. She appears well-developed.   HENT:   Head: Normocephalic and atraumatic.   Eyes: Pupils are equal, round, and reactive to light. EOM are normal.   Abdominal: Normal appearance.   Neurological: She is alert and oriented to person, place, and time. She has normal strength. She has a normal Tandem Gait Test. Gait normal.   Skin: Skin is warm. Rash noted.   Scattered dark flat macular lesions on legs and torso.    Psychiatric: Her speech is normal.   Nursing note and vitals reviewed.      Lab on 11/12/2020   Component Date Value Ref Range Status   • AEMLIA 11/12/2020 Negative   Final                                         Negative   <1:80                                       Borderline  1:80                                       Positive   >1:80   • Please note  11/12/2020 Comment   Final    AMELIA Multiplex methodology was designed to detect up to 11 antibodies  of the 100+ antibodies that may be detected by AMELIA IFA methodology.   • Rheumatoid Factor Quantitative 11/12/2020 <10.0  0.0 - 14.0 IU/mL Final   • Sed Rate 11/12/2020 1  0 - 20 mm/hr Final   • NMO/AQP4 FACS, S 11/12/2020 Negative  Negative Final    Recommend repeat testing in 6 months if clinical suspicion  is high. Negative result can occur in the setting of  immunosuppression.  -------------------ADDITIONAL INFORMATION-------------------  This test was developed and its performance characteristics  determined by [facility] in a manner consistent with CLIA  requirements. This test has not been cleared or approved by  the U.S. Food and Drug Administration.   • MOG FACS, S 11/12/2020 Negative  Negative Final    No informative autoantibodies were detected in this  evaluation. A negative result does not preclude a diagnosis  of an inflammatory CNS demyelinating disorder.  -------------------ADDITIONAL INFORMATION-------------------  This test was developed and its performance characteristics  determined by [facility] in a manner consistent with CLIA  requirements. This test has not been cleared or approved by  the U.S. Food and Drug Administration.         Assessment/Plan     Problem List Items Addressed This Visit        Cardiovascular and Mediastinum    Periodic headache syndrome, not intractable    Current Assessment & Plan     Headaches are improving with lifestyle modifications.  Continue current treatment regimen.     Improved, has been off Depakote for some times per patient report.                  Relevant Medications    propranolol (INDERAL) 10 MG tablet    amitriptyline (ELAVIL) 25 MG tablet       Nervous and Auditory    Transverse myelitis (CMS/HCC) - Primary    Current Assessment & Plan     Continues with MS Lonnie sensation.     Stopped OXC, instructed to finish the prednisone, rash continuing to improve.      Start Elavil 25 mg PO QHS for 2 weeks, may increase to 50 mg PO QHS thereafter.     F/U in 4 weeks or sooner if needed.          Relevant Medications    Armodafinil (Nuvigil) 250 MG tablet    amitriptyline (ELAVIL) 25 MG tablet      Other Visit Diagnoses     Neuropathic pain        Relevant Medications    amitriptyline (ELAVIL) 25 MG tablet             Return in about 4 weeks (around 12/28/2020).

## 2020-12-07 ENCOUNTER — TELEPHONE (OUTPATIENT)
Dept: NEUROLOGY | Facility: CLINIC | Age: 20
End: 2020-12-07

## 2020-12-07 DIAGNOSIS — M79.2 NEUROPATHIC PAIN: Primary | ICD-10-CM

## 2020-12-07 RX ORDER — AMITRIPTYLINE HYDROCHLORIDE 10 MG/1
10 TABLET, FILM COATED ORAL NIGHTLY
Qty: 30 TABLET | Refills: 0 | Status: SHIPPED | OUTPATIENT
Start: 2020-12-07 | End: 2020-12-11

## 2020-12-07 NOTE — TELEPHONE ENCOUNTER
We will try the lower dosage of Elavil and see if that still controls her discomfort without the other side effects.   If she is still having side effects on Elavil 10 mg PO QHS we will looking into something different. Let me know by Thursday how the lower dosage is doing. I called it into her [facility].

## 2020-12-07 NOTE — TELEPHONE ENCOUNTER
Patient informed to decrease from 25mg nightly to 10mg nightly. New rx sent to pharmacy on file. Patient advised to call our office Thursday with a follow up. She verbalized understanding.   -TMT

## 2020-12-07 NOTE — TELEPHONE ENCOUNTER
"Caller: JIL GIFFORD    Relationship: SELF    Best call back number: [phone]    When did you start taking these medications:   11/30/2020    Which medication are you concerned about: amitriptyline (ELAVIL) 25 MG tablet    Who prescribed you this medication: HAMILTON YUAN    What are your concerns: PT STATES SHE IS SO TIRED SHE COULD SLEEP 20 HOURS A DAY. STATES SHE ONLY WAKES UP WHEN HER FIANCE WAKES HER UP TO EAT. STATES SHE WAS TOLD IT WOULD HELP HER BIPOLAR DISORDER BY HAMILTON BUT IT HAS MADE HER \"BLAH\" SHE DOESN'T GET EXCITED OR ANYTHING. SHE STATES SHE DOES NOT FEEL HERSELF. SHE IS NOT AFFECTIONATE, HAS ZERO SEX DRIVE. STATES THAT IS NOT LIKE HER AT ALL. STATES SHE FEELS SO WEIRD. STATES TODAY SHE IS SUPPOSED TO UP HER DOSAGE TONIGHT, AND SHE DOES NOT WANT TO BECAUSE SHE DOESN'T KNOW IF ITS GOING TO GET WORSE. STATES THE MEDS HELP HER BACK, SHE ISN'T HAVING WEIRD SEIZURES, STATES THE MEDS ARE DOING ITS JOB BUT SHE WANTS TO FEEL LIKE HERSELF AGAIN. STATES SHE HAS BEEN GETTING HEADACHES MORE OFTEN SINCE STARTING THIS MEDICATION.     How long have you been taking these medications: SINCE November 30,2020          "

## 2020-12-11 ENCOUNTER — TELEPHONE (OUTPATIENT)
Dept: NEUROLOGY | Facility: CLINIC | Age: 20
End: 2020-12-11

## 2020-12-11 ENCOUNTER — OFFICE VISIT (OUTPATIENT)
Dept: NEUROLOGY | Facility: CLINIC | Age: 20
End: 2020-12-11

## 2020-12-11 DIAGNOSIS — M79.2 NEUROPATHIC PAIN: ICD-10-CM

## 2020-12-11 DIAGNOSIS — G37.3 TRANSVERSE MYELITIS (HCC): Primary | ICD-10-CM

## 2020-12-11 DIAGNOSIS — M79.2 NEUROPATHIC PAIN: Primary | ICD-10-CM

## 2020-12-11 PROCEDURE — 99442 PR PHYS/QHP TELEPHONE EVALUATION 11-20 MIN: CPT | Performed by: NURSE PRACTITIONER

## 2020-12-11 RX ORDER — GABAPENTIN 100 MG/1
100 CAPSULE ORAL 2 TIMES DAILY
Qty: 30 CAPSULE | Refills: 0 | Status: SHIPPED | OUTPATIENT
Start: 2020-12-11 | End: 2021-02-25 | Stop reason: SDUPTHER

## 2020-12-11 RX ORDER — TIZANIDINE 2 MG/1
2 TABLET ORAL EVERY 6 HOURS PRN
Qty: 60 TABLET | Refills: 2 | Status: SHIPPED | OUTPATIENT
Start: 2020-12-11 | End: 2021-04-15

## 2020-12-11 NOTE — TELEPHONE ENCOUNTER
"Per patient, the lower dose of Amitriptyline made things worse. She is requesting something else. Also, she stated that she do not want a \"highly addictive\" medication   "

## 2020-12-11 NOTE — TELEPHONE ENCOUNTER
PT CALLED BACK STATING TIZANIDINE HAS BEEN CALLED INTO HER PHARMACY AND SHE WOULD LIKE TO KNOW WHAT ALL IS NEEDED FROM HER IN ORDER TO GET GABAPENTIN FILLED AT HER PHARMACY AS WELL. PLEASE CALL PT TO DISCUSS DRUG SCREENING AND FORMS TO SIGN SOMETIME TODAY AS PT IS CONCERNED AND DOESN'T WANT TO GO WITHOUT MEDICATION FOR A LONG  PERIOD OF TIME.      CALL BACK- [phone]

## 2020-12-11 NOTE — TELEPHONE ENCOUNTER
Patient cannot do this. She is requesting a drug that is not controlled. Okay to schedule telephone visit to discuss?

## 2020-12-11 NOTE — TELEPHONE ENCOUNTER
Stop the Amitriptyline.   The other options to try are Gabapentin, Lyrica, or muscle relaxers such as Baclofen or Zanaflex. The muscle relaxers would be something she takes when the pain begins. The gapaentin or lyrica would be a daily medication. They are not considered to be highly addictive drugs. We would be able to wean you off or stop them if needed.

## 2020-12-11 NOTE — TELEPHONE ENCOUNTER
----- Message from Haily Posey sent at 12/10/2020  3:32 PM EST -----  Regarding: MEDICATION  Contact: [phone]  Per patient she was told to call back and report how she is doing after taking the lower dosage of Amitriptyline.

## 2020-12-11 NOTE — TELEPHONE ENCOUNTER
Patient is wanting a daily medication and PRN. She is willing to try the medications mentioned below.

## 2020-12-11 NOTE — PROGRESS NOTES
Subjective   Patient ID: Trisha Payne is a 20 y.o. female     Chief Complaint   Patient presents with   • Transverse Myelitis     Medication Concerns        History of Present Illness    20 y.o. female returns in follow for bilateral LE numbness, migraines.  Last visit on 11/30/20 Started Elavil, stopped OXC.    Patient called with worsening depression on Elavil, decreased dose to 10 mg still having trouble. Scheduled telephone visit.     Patient called on 11/25/20 with c/o rash from torso down, instructed to stop OXC, called in prednisone and scheduled for a F/U.    Rash started Saturday the 21st, took the OXC for a week and a few days then developed rash. Stopped the dosage of OXC on 11/25, started the prednisone, itching and rash has decreased over the past few days. Rash now only hyperpigmented dark spots which are fading, itching has decreased significantly.     MRI B/C/T 6/3/20 brain one left PVWM T2 lesion, no cervical cord lesions, T7 cord lesion, no enhancement, small thoracic meningioma     Las: MOG, NMO, AMELIA, Sed rate, RF - negative.     MOG - neg    Hug sensation continues, feels like her chest spasms and she cannot breathe properly. Happened less frequently and less time per episode on the OXC, has had 4 bad episodes since stopping OXC.     Preventatives: OXC, Elavil    Hips and back pain continue.  Joint pain in hands.  Hands and feet swell.  Pins and needles in feet.      Fatigue is moderate to severe.      Rash resolved with not going out in sun.      Problem history:    Developed ascending numbness from feet to rib cage over 3 weeks. Felt slightly clumsy.  Could not feel shoes or pants.  Decreased LT.      Sx resolved by March 2020.  Episodes of N and weakness in LE lasting for a minute at time.  Flexing head or back provokes sx.      Feet developed numbness after being gone for a few weeks.  Recurred with HA.      Migraines  She stopped taking the Depakote due to cloudiness, HA frequency  has decreased to once or twice per month. Severity has decreased.     Flashing light in peripheral of OD.  Frequency once every two weeks.   Moderate intensity.  Last for 1 - 1.5 hours.  Located in band around head.  Quality is a dull ache.      Taking Sumatriptan but cannot tolerate.       Preventatives: Propranolol, OXC, Depakote     Reviewed medical records:    New onset of B LE numbness starting in 11/2019.  Started in toes and ascended up LE.  L > R sx.  Sensation of walking on sand.  Evaluated at  ED and noted to have decreased sensation from waist down.      review of films:    MRI thoracic spine - T7/8 cord lesion.    MRI Brain/cervical - 1/9/20 tiny T2 lesion R PVWM probable venous anomaly, no cord lesions      4/6/20 reported HA 5 times a week.  Severe intensity, N, photophobia,     Evaluation by Dr Navarro NMO, SSA/SSB, HIV, RPR, ACE, ANCA neg  Recommended monitoring for second demyelinating lesion.    Started Lyrica. Answers for HPI/ROS submitted by the patient on 5/17/2020   Neurologic complaint  What is the primary reason for your visit?: Neurological Problem      Past Medical History:   Diagnosis Date   • Anemia    • Anxiety    • Bipolar disorder (CMS/HCC)    • Depression    • Ovarian cyst      Family History   Problem Relation Age of Onset   • Hypertension Paternal Grandfather    • Thyroid cancer Maternal Grandmother    • Mental illness Mother    • Migraines Mother    • Diabetes Father      Social History     Socioeconomic History   • Marital status: Single     Spouse name: Not on file   • Number of children: Not on file   • Years of education: Not on file   • Highest education level: Not on file   Tobacco Use   • Smoking status: Never Smoker   • Smokeless tobacco: Never Used   Substance and Sexual Activity   • Alcohol use: No   • Drug use: No   • Sexual activity: Yes     Partners: Male     Birth control/protection: Condom       Review of Systems   Constitutional: Positive for fatigue. Negative  for activity change, diaphoresis, fever and unexpected weight change.   HENT: Negative for tinnitus and trouble swallowing.    Eyes: Negative for photophobia and visual disturbance.   Respiratory: Positive for shortness of breath. Negative for apnea, cough and choking.    Cardiovascular: Positive for chest pain. Negative for palpitations and leg swelling.   Gastrointestinal: Positive for nausea and vomiting. Negative for abdominal pain.   Endocrine: Negative for cold intolerance and heat intolerance.   Genitourinary: Negative for difficulty urinating, frequency, menstrual problem and urgency.   Musculoskeletal: Positive for back pain and neck pain. Negative for gait problem and myalgias.   Skin: Negative for color change and rash.   Allergic/Immunologic: Negative for immunocompromised state.   Neurological: Positive for dizziness, weakness, light-headedness and headaches. Negative for tremors, seizures, syncope, facial asymmetry, speech difficulty and numbness.   Hematological: Negative for adenopathy. Does not bruise/bleed easily.   Psychiatric/Behavioral: Positive for confusion. Negative for behavioral problems, decreased concentration, hallucinations and sleep disturbance.       Objective     There were no vitals filed for this visit.    Neurologic Exam    Physical Exam    Lab on 11/12/2020   Component Date Value Ref Range Status   • AMELIA 11/12/2020 Negative   Final                                         Negative   <1:80                                       Borderline  1:80                                       Positive   >1:80   • Please note 11/12/2020 Comment   Final    AMELIA Multiplex methodology was designed to detect up to 11 antibodies  of the 100+ antibodies that may be detected by AMELIA IFA methodology.   • Rheumatoid Factor Quantitative 11/12/2020 <10.0  0.0 - 14.0 IU/mL Final   • Sed Rate 11/12/2020 1  0 - 20 mm/hr Final   • NMO/AQP4 FACS, S 11/12/2020 Negative  Negative Final    Recommend repeat testing  in 6 months if clinical suspicion  is high. Negative result can occur in the setting of  immunosuppression.  -------------------ADDITIONAL INFORMATION-------------------  This test was developed and its performance characteristics  determined by [facility] in a manner consistent with CLIA  requirements. This test has not been cleared or approved by  the U.S. Food and Drug Administration.   • MOG FACS, S 11/12/2020 Negative  Negative Final    No informative autoantibodies were detected in this  evaluation. A negative result does not preclude a diagnosis  of an inflammatory CNS demyelinating disorder.  -------------------ADDITIONAL INFORMATION-------------------  This test was developed and its performance characteristics  determined by [facility] in a manner consistent with CLIA  requirements. This test has not been cleared or approved by  the U.S. Food and Drug Administration.         Assessment/Plan     Problem List Items Addressed This Visit        Nervous and Auditory    Neuropathic pain - Primary    Current Assessment & Plan     Discussed Cymbalta or Gabapentin, patient wishes to try Gabaptentin.     Called in 100 mg PO BID, patient is very sensitive to medications so I have called in a low dose.     Zanaflex PRN called in, patient v/u that both medications can make her drowsy    Drug screen to be completed at [facility] on Monday     F/U in 2 weeks to discuss effectiveness.            Relevant Medications    tiZANidine (ZANAFLEX) 2 MG tablet    gabapentin (Neurontin) 100 MG capsule    Other Relevant Orders    Drug Screen (10), Serum             Return in about 11 days (around 12/22/2020).       I spent a total of 15 minutes via telephone visit providing patient care.

## 2020-12-11 NOTE — TELEPHONE ENCOUNTER
I will call in some Tizanidine to her pharmacy to take PRN. Please caution her that it can make her drowsy, I advise she not drive while taking it until she knows how it will affect her.     We can do gabapentin, but I will need her to come in for a drug screen and we will have to have her sign a controlled substance agreement form.   Once I have the form and the drug screen back I will call it into her pharmacy. She will take it twice per day.

## 2020-12-11 NOTE — ASSESSMENT & PLAN NOTE
Discussed Cymbalta or Gabapentin, patient wishes to try Gabaptentin.     Called in 100 mg PO BID, patient is very sensitive to medications so I have called in a low dose.     Zanaflex PRN called in, patient v/u that both medications can make her drowsy    Drug screen to be completed at [facility] on Monday     F/U in 2 weeks to discuss effectiveness.

## 2020-12-14 ENCOUNTER — TELEPHONE (OUTPATIENT)
Dept: NEUROLOGY | Facility: CLINIC | Age: 20
End: 2020-12-14

## 2020-12-14 NOTE — TELEPHONE ENCOUNTER
PT CALLED IN TODAY WITH A QUESTION ABOUT DRUG INTERACTIONS. SHE STATES SHE IS CURRENTLY TAKING ARMODAFINIL, GABAPENTIN AND TIZANIDINE. SHE STATES SHE HAD PREVIOUSLY BEEN ON DIVALPROEX 500MG ONE TABLET AT BED[facility] BUT SHE HASN'T BEEN ON THAT RX FOR A FEW MONTHS NOW AND WHEN SHE WAS ON DIVALPROEX, SHE WASN'T TAKING ANY OTHER MEDICATION. SHE IS WANTING TO BEGIN DIVALPROEX AGAIN FOR HEADACHES AND WANTED TO KNOW IF THERE WOULD BE ANY INTERACTIONS WITH THE OTHER MEDICATION SHE'S ON. SHE STATES THE MEDICATION SHE IS ON CURRENTLY FOR HEADACHES ISN'T HELPING HER AND SHE GETS DAILY HEADACHES AS SOON AS SHE WAKES UP. SHE STATES THEY ARE NORMALLY GONE WITHIN A FEW HOURS BUT HER IBUPROFEN IS NO LONGER HELPING HER. SHE STATES THE DAILY HEADACHES HAVE BEEN GOING ON FOR ABOUT A WEEK NOW. SHE HAS BEEN NAUSEATED EVERY DAY FOR A COUPLE OF WEEKS NOW BUT SHE DOESN'T THINK THAT CORRELATES TO THE HEADACHES SHE HAS AS THEY DON'T OCCUR AT THE SAME TIME. PLEASE REVIEW AND ADVISE      CALL BACK- [phone]

## 2020-12-14 NOTE — TELEPHONE ENCOUNTER
The Gabapentin that I have called in for her will also help with headache prevention, lets give that some time to work before starting another preventative medication. I can call in an abortive medication called Ubrelvy if she would like to try that.

## 2020-12-15 ENCOUNTER — TELEPHONE (OUTPATIENT)
Dept: NEUROLOGY | Facility: CLINIC | Age: 20
End: 2020-12-15

## 2020-12-15 NOTE — TELEPHONE ENCOUNTER
----- Message from MODESTO Robert sent at 12/11/2020  3:29 PM EST -----  Regarding: F/U - labs  Jabariamelie Valentino,     Can you schedule her a telephone visit on Dec. 22nd at 10:30?   And can you send a drug screen order to [facility] in [place] for her to have drawn on Monday.     Thank you!!

## 2020-12-15 NOTE — TELEPHONE ENCOUNTER
Patient appointment rescheduled. Lab order faxed to [facility] in [place] at [phone]. Fax successful.   -TMT

## 2020-12-17 ENCOUNTER — TELEPHONE (OUTPATIENT)
Dept: NEUROLOGY | Facility: CLINIC | Age: 20
End: 2020-12-17

## 2020-12-17 NOTE — TELEPHONE ENCOUNTER
No, the medications you are taking will not cause a false positive. You need to call your OBGYN and let them know you have a positive pregnancy test. And stop your Gabapentin and Zanaflex

## 2020-12-17 NOTE — TELEPHONE ENCOUNTER
----- Message from Trisha Payne sent at 12/17/2020  1:35 PM EST -----  Regarding: Prescription Question  Contact: [phone]  Can my medications cause a positive pregnancy test? I have tested for 3 days and they are all positive. I am looking for any other explanation because of my extensive fertility issues. This seems impossible.

## 2020-12-21 ENCOUNTER — LAB (OUTPATIENT)
Dept: LAB | Facility: HOSPITAL | Age: 20
End: 2020-12-21

## 2020-12-21 ENCOUNTER — TRANSCRIBE ORDERS (OUTPATIENT)
Dept: LAB | Facility: HOSPITAL | Age: 20
End: 2020-12-21

## 2020-12-21 DIAGNOSIS — Z32.01 PREGNANCY EXAMINATION OR TEST, POSITIVE RESULT: ICD-10-CM

## 2020-12-21 DIAGNOSIS — Z32.01 PREGNANCY EXAMINATION OR TEST, POSITIVE RESULT: Primary | ICD-10-CM

## 2020-12-21 LAB
ABO GROUP BLD: NORMAL
RH BLD: NEGATIVE

## 2020-12-21 PROCEDURE — 84702 CHORIONIC GONADOTROPIN TEST: CPT

## 2020-12-21 PROCEDURE — 86900 BLOOD TYPING SEROLOGIC ABO: CPT

## 2020-12-21 PROCEDURE — 36415 COLL VENOUS BLD VENIPUNCTURE: CPT

## 2020-12-21 PROCEDURE — 86901 BLOOD TYPING SEROLOGIC RH(D): CPT

## 2020-12-22 LAB — HCG INTACT+B SERPL-ACNC: NORMAL MIU/ML

## 2021-02-01 ENCOUNTER — TELEPHONE (OUTPATIENT)
Dept: NEUROLOGY | Facility: CLINIC | Age: 21
End: 2021-02-01

## 2021-02-01 DIAGNOSIS — G37.3 TRANSVERSE MYELITIS (HCC): Primary | ICD-10-CM

## 2021-02-01 NOTE — TELEPHONE ENCOUNTER
Provider: MODESTO JACKSON  Caller: JIL GIFFORD  Relationship to Patient: SELF    Reason for Call: PT WAS OFF ALL MS MEDS DUE TO PREGNACY AND SHE IS GOING TO START THEM AGAIN. SHE NEEDS TO KNOW WHAT REGIME SHE WAS ON.      PLEASE C ALL HER BACK -[phone]

## 2021-02-01 NOTE — TELEPHONE ENCOUNTER
Patient is wanting to start back on MS medication. Should I get her scheduled for any early appointment with you or keep her appointment for March with Dr. Ty?

## 2021-02-02 NOTE — TELEPHONE ENCOUNTER
Called x1. Not able to LVM. Will try again later to reach out to patient. If she is still pregnant it is not recommended for her to take the medications she was on for MS.

## 2021-02-03 NOTE — TELEPHONE ENCOUNTER
Can you call patient to let her know. I tried calling her yesterday and she states she did not get the call. I will speak with her if you'd like just not sure why my phone call yesterday did not go through.

## 2021-02-03 NOTE — TELEPHONE ENCOUNTER
Pt called in and stated she never received a call and she is not pregnant. Please advise and return call.     Thanks

## 2021-02-04 NOTE — TELEPHONE ENCOUNTER
PATIENT STATES SHE HAD DRUG TEST DONE AT [facility] IN [facility] LESS THAN 6 MONTHS AGO POSSIBLY IN December AND SHE HAD MISCARRIAGE ON January 9TH OF 2021 AND HER OBGYN TOLD HER THAT THE TESTS MAY STILL COME BACK POSITIVE FROM 4-9 WEEKS AND SHE SAID THE PREGNANCY TEST WILL COME BACK POSITIVE.     PLEASE ADVISE.    PH: [phone].     IF YOU CALL FROM 10AM TO 4PM SHE WILL BE AT WORK.

## 2021-02-04 NOTE — TELEPHONE ENCOUNTER
Attempted to call patient but no answer and she has a vm box that has not been set up.    HUB to READ;  If she returns call to clinic let her know Jo placed orders for a pregnancy and drug screen. She can go to any [facility] lab including our own here @ [facility] urgent care lab to have this done and once we receive the results she will refill her Gabapentin. Thanks!

## 2021-02-10 ENCOUNTER — TELEPHONE (OUTPATIENT)
Dept: NEUROLOGY | Facility: CLINIC | Age: 21
End: 2021-02-10

## 2021-02-10 NOTE — TELEPHONE ENCOUNTER
Provider: HAMILTON SALVADOR  Caller: PT  Relationship to Patient: SELF    Phone Number: [phone]  Reason for Call: PT CALLED IN STATING SHE WANTED TO LET THE OFFICE KNOW THAT SHE RECEIVED HER FIRST NEGATIVE PREGNANCY TEST TODAY SINCE AFTER HER MISCARRIAGE IN REGARDS TO STARTING BACK HER MEDICATIONS.

## 2021-02-11 NOTE — TELEPHONE ENCOUNTER
Attempted to call Trisha, however no answer and her vm box has not been set up yet.    HUB to Read:  If patient calls back please ask her where she had her pregnancy test done yesterday so I can request these records for the provider. Also if her last drug screen was 6 months ago, Jo is still needing a more recent one completed. Thanks!    Note: If she has further questions just send her to me. Thanks guys!

## 2021-02-17 ENCOUNTER — TELEPHONE (OUTPATIENT)
Dept: NEUROLOGY | Facility: CLINIC | Age: 21
End: 2021-02-17

## 2021-02-17 NOTE — TELEPHONE ENCOUNTER
----- Message from Clara Hensley RN sent at 2/17/2021  9:14 AM EST -----  Regarding: Non-Urgent Medical Question  Contact: [phone]  Try to get her scheduled with you?     ----- Message -----  From: Trisha Fer Payne  Sent: 2/16/2021  11:05 PM EST  To: [facility] [place]  Subject: Non-Urgent Medical Question                      Danielle Maria, when I first came to see you I had a numbness that was creeping up my legs starting at my toes and moving up. Well, it started again. Starting in my toes and feet, feeling like bags of sand and the ends of my legs, then moving up an inch or two every day. The skin numbness is up to a little higher than mid thigh today. I called a week or two ago trying to get an appointment with you when it first started, trying to get back on my meds hoping they would help, but they said I can't get in to see you until March. You said to let you know when I started having another episode, so I'm hoping you can give me some guidance.    Thank you.

## 2021-02-25 ENCOUNTER — OFFICE VISIT (OUTPATIENT)
Dept: NEUROLOGY | Facility: CLINIC | Age: 21
End: 2021-02-25

## 2021-02-25 ENCOUNTER — LAB (OUTPATIENT)
Dept: LAB | Facility: HOSPITAL | Age: 21
End: 2021-02-25

## 2021-02-25 VITALS
SYSTOLIC BLOOD PRESSURE: 98 MMHG | OXYGEN SATURATION: 99 % | HEART RATE: 88 BPM | DIASTOLIC BLOOD PRESSURE: 62 MMHG | BODY MASS INDEX: 19.29 KG/M2 | WEIGHT: 113 LBS | HEIGHT: 64 IN

## 2021-02-25 DIAGNOSIS — G37.3 TRANSVERSE MYELITIS (HCC): ICD-10-CM

## 2021-02-25 DIAGNOSIS — G37.3 TRANSVERSE MYELITIS (HCC): Primary | ICD-10-CM

## 2021-02-25 DIAGNOSIS — M79.2 NEUROPATHIC PAIN: ICD-10-CM

## 2021-02-25 PROCEDURE — 86255 FLUORESCENT ANTIBODY SCREEN: CPT

## 2021-02-25 PROCEDURE — 36415 COLL VENOUS BLD VENIPUNCTURE: CPT

## 2021-02-25 PROCEDURE — 99215 OFFICE O/P EST HI 40 MIN: CPT | Performed by: PSYCHIATRY & NEUROLOGY

## 2021-02-25 RX ORDER — PREDNISONE 20 MG/1
20 TABLET ORAL DAILY
Qty: 30 TABLET | Refills: 6 | Status: SHIPPED | OUTPATIENT
Start: 2021-02-25 | End: 2021-04-08

## 2021-02-25 RX ORDER — GABAPENTIN 100 MG/1
100 CAPSULE ORAL 2 TIMES DAILY
Qty: 60 CAPSULE | Refills: 6 | Status: SHIPPED | OUTPATIENT
Start: 2021-02-25 | End: 2021-05-17 | Stop reason: SDUPTHER

## 2021-02-25 RX ORDER — PROMETHAZINE HYDROCHLORIDE 25 MG/1
25 TABLET ORAL EVERY 6 HOURS
COMMUNITY
Start: 2020-12-28 | End: 2021-04-15

## 2021-02-25 NOTE — ASSESSMENT & PLAN NOTE
Sx started two weeks ago.  Pads of feet started feeling odd. Then ascended to to T10.  Recently developed saddle anesthesia.     Miscarriage January 9 at 8 weeks 3 days.      MRI B/C/T    MOG, NMO ab    Prednisone 20 mg daily

## 2021-02-25 NOTE — PROGRESS NOTES
Subjective   Patient ID: Trisha Payne is a 20 y.o. female     Chief Complaint   Patient presents with   • neuropathic pain        History of Present Illness    20 y.o. female returns in follow for bilateral LE numbness, migraines.  Last visit on 12/11/20 Started Elavil, stopped OXC.    Patient called with worsening depression on Elavil, decreased dose to 10 mg still having trouble. Scheduled telephone visit.     Patient called on 11/25/20 with c/o rash from torso down, instructed to stop OXC, called in prednisone and scheduled for a F/U.    Rash started Saturday the 21st, took the OXC for a week and a few days then developed rash. Stopped the dosage of OXC on 11/25, started the prednisone, itching and rash has decreased over the past few days. Rash now only hyperpigmented dark spots which are fading, itching has decreased significantly.     MRI B/C/T 6/3/20 brain one left PVWM T2 lesion, no cervical cord lesions, T7 cord lesion, no enhancement, small thoracic meningioma     Las: MOG, NMO, AMELIA, Sed rate, RF - negative.     MOG - neg    Hug sensation continues, feels like her chest spasms and she cannot breathe properly. Happened less frequently and less time per episode on the OXC, has had 4 bad episodes since stopping OXC.     Preventatives: OXC, Elavil    Hips and back pain continue.  Joint pain in hands.  Hands and feet swell.  Pins and needles in feet.      Fatigue is moderate to severe.      Rash resolved with not going out in sun.      Problem history:    Developed ascending numbness from feet to rib cage over 3 weeks. Felt slightly clumsy.  Could not feel shoes or pants.  Decreased LT.      Sx resolved by March 2020.  Episodes of N and weakness in LE lasting for a minute at time.  Flexing head or back provokes sx.      Feet developed numbness after being gone for a few weeks.  Recurred with HA.      Migraines  She stopped taking the Depakote due to cloudiness, HA frequency has decreased to once or  twice per month. Severity has decreased.     Flashing light in peripheral of OD.  Frequency once every two weeks.   Moderate intensity.  Last for 1 - 1.5 hours.  Located in band around head.  Quality is a dull ache.      Taking Sumatriptan but cannot tolerate.       Preventatives: Propranolol, OXC, Depakote     Reviewed medical records:    New onset of B LE numbness starting in 11/2019.  Started in toes and ascended up LE.  L > R sx.  Sensation of walking on sand.  Evaluated at  ED and noted to have decreased sensation from waist down.      review of films:    MRI thoracic spine - T7/8 cord lesion.    MRI Brain/cervical - 1/9/20 tiny T2 lesion R PVWM probable venous anomaly, no cord lesions      4/6/20 reported HA 5 times a week.  Severe intensity, N, photophobia,     Evaluation by Dr Navarro NMO, SSA/SSB, HIV, RPR, ACE, ANCA neg  Recommended monitoring for second demyelinating lesion.    Started Lyrica. Answers for HPI/ROS submitted by the patient on 5/17/2020   Neurologic complaint  What is the primary reason for your visit?: Neurological Problem      Past Medical History:   Diagnosis Date   • Anemia    • Anxiety    • Bipolar disorder (CMS/HCC)    • Depression    • Ovarian cyst      Family History   Problem Relation Age of Onset   • Hypertension Paternal Grandfather    • Thyroid cancer Maternal Grandmother    • Mental illness Mother    • Migraines Mother    • Diabetes Father      Social History     Socioeconomic History   • Marital status: Single     Spouse name: Not on file   • Number of children: Not on file   • Years of education: Not on file   • Highest education level: Not on file   Tobacco Use   • Smoking status: Never Smoker   • Smokeless tobacco: Never Used   Substance and Sexual Activity   • Alcohol use: No   • Drug use: No   • Sexual activity: Yes     Partners: Male     Birth control/protection: Condom       Review of Systems   Constitutional: Positive for fatigue. Negative for activity change,  "diaphoresis, fever and unexpected weight change.   HENT: Negative for tinnitus and trouble swallowing.    Eyes: Negative for photophobia and visual disturbance.   Respiratory: Positive for shortness of breath. Negative for apnea, cough and choking.    Cardiovascular: Positive for chest pain. Negative for palpitations and leg swelling.   Gastrointestinal: Positive for nausea and vomiting. Negative for abdominal pain.   Endocrine: Negative for cold intolerance and heat intolerance.   Genitourinary: Negative for difficulty urinating, frequency, menstrual problem and urgency.   Musculoskeletal: Positive for back pain and neck pain. Negative for gait problem and myalgias.   Skin: Negative for color change and rash.   Allergic/Immunologic: Negative for immunocompromised state.   Neurological: Positive for dizziness, weakness, light-headedness and headaches. Negative for tremors, seizures, syncope, facial asymmetry, speech difficulty and numbness.   Hematological: Negative for adenopathy. Does not bruise/bleed easily.   Psychiatric/Behavioral: Positive for confusion. Negative for behavioral problems, decreased concentration, hallucinations and sleep disturbance.       Objective     Vitals:    02/25/21 1326   BP: 98/62   Pulse: 88   SpO2: 99%   Weight: 51.3 kg (113 lb)   Height: 162.6 cm (64.02\")       Neurologic Exam    Physical Exam    Lab on 12/21/2020   Component Date Value Ref Range Status   • HCG Quantitative 12/21/2020 15,273.00  mIU/mL Final   • ABO Type 12/21/2020 O   Final   • RH type 12/21/2020 Negative   Final         Assessment/Plan     Problem List Items Addressed This Visit     None             No follow-ups on file.       I spent a total of 15 minutes via telephone visit providing patient care.           "

## 2021-02-25 NOTE — PROGRESS NOTES
Chief Complaint  neuropathic pain    Subjective          Trisha Payne presents to [facility] for transverse myelitis,      History of Present Illness    20 y.o. female returns in follow up.  Last visit on 11/12/2020 continued Depakote, ordered labs, added OXC.      MRI B/C/T 6/3/20 brain one left PVWM T2 lesion, no cervical cord lesions, T7 cord lesion, no enhancement, small thoracic meningioma      MOG - neg     Pt reports over the last two weeks developing ascending numbness.  Started in feet. Progressed to T10.  Last few days developed saddle anesthesia.  LE feel weak and shaking.      Miscarriage Jan 9.        Fatigue is moderate to severe.       Rash resolved with not going out in sun.       Problem history:     Developed ascending numbness from feet to rib cage over 3 weeks. Felt slightly clumsy.  Could not feel shoes or pants.  Decreased LT.       Sx resolved by March 2020.  Episodes of N and weakness in LE lasting for a minute at time.  Flexing head or back provokes sx.       Feet developed numbness after being gone for a few weeks.  Recurred with HA.       Migraines     Flashing light in peripheral of OD.  Frequency once every two weeks.   Moderate intensity.  Last for 1 - 1.5 hours.  Located in band around head.  Quality is a dull ache.      Taking Sumatriptan but cannot tolerate.        Reviewed medical records:     New onset of B LE numbness starting in 11/2019.  Started in toes and ascended up LE.  L > R sx.  Sensation of walking on sand.  Evaluated at  ED and noted to have decreased sensation from waist down.       My review of films:     MRI thoracic spine - T7/8 cord lesion.    MRI Brain/cervical - 1/9/20 tiny T2 lesion R PVWM probable venous anomaly, no cord lesions       4/6/20 reported HA 5 times a week.  Severe intensity, N, photophobia,      Evaluation by Dr Navarro NMO, SSA/SSB, HIV, RPR, ACE, ANCA neg  Recommended monitoring for second demyelinating  "lesion.           Objective   Vital Signs:   BP 98/62   Pulse 88   Ht 162.6 cm (64.02\")   Wt 51.3 kg (113 lb)   SpO2 99%   BMI 19.39 kg/m²     Physical Exam  Eyes:      Extraocular Movements: EOM normal.      Pupils: Pupils are equal, round, and reactive to light.   Neurological:      Mental Status: She is oriented to person, place, and time.      Gait: Gait is intact.      Deep Tendon Reflexes: Strength normal.   Psychiatric:         Speech: Speech normal.          Neurologic Exam     Mental Status   Oriented to person, place, and time.   Speech: speech is normal   Level of consciousness: alert  Knowledge: good and consistent with education.   Normal comprehension.     Cranial Nerves   Cranial nerves II through XII intact.     CN II   Visual fields full to confrontation.   Visual acuity: normal  Right visual field deficit: none  Left visual field deficit: none     CN III, IV, VI   Pupils are equal, round, and reactive to light.  Extraocular motions are normal.   Nystagmus: none   Diplopia: none  Ophthalmoparesis: none  Upgaze: normal  Downgaze: normal  Conjugate gaze: present    CN V   Facial sensation intact.   Right corneal reflex: normal  Left corneal reflex: normal    CN VII   Right facial weakness: none  Left facial weakness: none    CN VIII   Hearing: intact    CN IX, X   Palate: symmetric  Right gag reflex: normal  Left gag reflex: normal    CN XI   Right sternocleidomastoid strength: normal  Left sternocleidomastoid strength: normal    CN XII   Tongue: not atrophic  Fasciculations: absent  Tongue deviation: none    Motor Exam   Muscle bulk: normal  Overall muscle tone: normal    Strength   Strength 5/5 throughout.     Sensory Exam   Lowest sensation to pinprick on right: T10  Lowest sensation to pinprick on left: T10    Gait, Coordination, and Reflexes     Gait  Gait: normal    Tremor   Resting tremor: absent  Intention tremor: absent  Action tremor: absent    Reflexes   Reflexes 2+ except as noted.    "     Result Review :                 Assessment and Plan    Diagnoses and all orders for this visit:    1. Transverse myelitis (CMS/HCC) (Primary)  Assessment & Plan:  Sx started two weeks ago.  Pads of feet started feeling odd. Then ascended to to T10.  Recently developed saddle anesthesia.     Miscarriage January 9 at 8 weeks 3 days.      MRI B/C/T    MOG, NMO ab    Prednisone 20 mg daily      Orders:  -     MRI Brain With & Without Contrast; Future  -     MRI Cervical Spine With & Without Contrast; Future  -     MRI Thoracic Spine With & Without Contrast; Future  -     Neuromyelitis Optica (NMO) Auto Antibody, IgG; Future  -     MOG FACS, SERUM; Future    2. Neuropathic pain  Assessment & Plan:  Wishes to restart GBP     Orders:  -     gabapentin (Neurontin) 100 MG capsule; Take 1 capsule by mouth 2 (two) times a day.  Dispense: 60 capsule; Refill: 6    Other orders  -     predniSONE (DELTASONE) 20 MG tablet; Take 1 tablet by mouth Daily.  Dispense: 30 tablet; Refill: 6        Follow Up   No follow-ups on file.  Patient was given instructions and counseling regarding her condition or for health maintenance advice. Please see specific information pulled into the AVS if appropriate.

## 2021-03-04 LAB — AQP4 H2O CHANNEL IGG SERPL QL: NEGATIVE

## 2021-03-05 LAB — MOG FACS, S: NEGATIVE

## 2021-03-10 RX ORDER — PREDNISONE 10 MG/1
TABLET ORAL
Qty: 18 TABLET | Refills: 0 | OUTPATIENT
Start: 2021-03-10

## 2021-03-12 ENCOUNTER — APPOINTMENT (OUTPATIENT)
Dept: MRI IMAGING | Facility: HOSPITAL | Age: 21
End: 2021-03-12

## 2021-03-24 ENCOUNTER — TELEPHONE (OUTPATIENT)
Dept: NEUROLOGY | Facility: CLINIC | Age: 21
End: 2021-03-24

## 2021-03-24 DIAGNOSIS — G37.3 TRANSVERSE MYELITIS (HCC): Primary | ICD-10-CM

## 2021-03-24 RX ORDER — ALPRAZOLAM 1 MG/1
TABLET ORAL
Qty: 2 TABLET | Refills: 0 | Status: SHIPPED | OUTPATIENT
Start: 2021-03-24 | End: 2021-04-15

## 2021-03-24 NOTE — TELEPHONE ENCOUNTER
Provider: NIKUNJ  Caller: JIL  Relationship to Patient: PT   Pharmacy: [facility]   Phone Number: [phone]  Reason for Call: PT IS CALLING TO GET MEDICATION AS SHE HAS MRI'S SCHEDULED FOR Friday, PT WILL NEED THIS MEDICATION FOR TOMORROW AS SOMEONE WILL BE DRIVING HER TO GET THE MRIS DONE PT WOULD LIKE THE SAME THING THAT WAS PRESCRIBED IN MAY AS THAT WORKED WELL (ALPRAZolam (Xanax) 1 MG tablet) PLEASE REVIEW AND ADVISE.

## 2021-03-26 ENCOUNTER — HOSPITAL ENCOUNTER (OUTPATIENT)
Dept: MRI IMAGING | Facility: HOSPITAL | Age: 21
Discharge: HOME | End: 2021-03-26

## 2021-03-26 DIAGNOSIS — G37.3 TRANSVERSE MYELITIS (HCC): ICD-10-CM

## 2021-03-26 PROCEDURE — 70553 MRI BRAIN STEM W/O & W/DYE: CPT

## 2021-03-26 PROCEDURE — 72156 MRI NECK SPINE W/O & W/DYE: CPT

## 2021-03-26 PROCEDURE — 0 GADOBENATE DIMEGLUMINE 529 MG/ML SOLUTION: Performed by: PSYCHIATRY & NEUROLOGY

## 2021-03-26 PROCEDURE — A9577 INJ MULTIHANCE: HCPCS | Performed by: PSYCHIATRY & NEUROLOGY

## 2021-03-26 PROCEDURE — 72157 MRI CHEST SPINE W/O & W/DYE: CPT

## 2021-03-26 RX ADMIN — GADOBENATE DIMEGLUMINE 10 ML: 529 INJECTION, SOLUTION INTRAVENOUS at 15:52

## 2021-04-08 ENCOUNTER — OFFICE VISIT (OUTPATIENT)
Dept: NEUROLOGY | Facility: CLINIC | Age: 21
End: 2021-04-08

## 2021-04-08 ENCOUNTER — LAB (OUTPATIENT)
Dept: LAB | Facility: HOSPITAL | Age: 21
End: 2021-04-08

## 2021-04-08 VITALS
HEIGHT: 64 IN | DIASTOLIC BLOOD PRESSURE: 64 MMHG | OXYGEN SATURATION: 98 % | SYSTOLIC BLOOD PRESSURE: 102 MMHG | WEIGHT: 114 LBS | HEART RATE: 85 BPM | BODY MASS INDEX: 19.46 KG/M2

## 2021-04-08 DIAGNOSIS — G35 MULTIPLE SCLEROSIS (HCC): Primary | ICD-10-CM

## 2021-04-08 DIAGNOSIS — G43.C0 PERIODIC HEADACHE SYNDROME, NOT INTRACTABLE: ICD-10-CM

## 2021-04-08 DIAGNOSIS — G35 MULTIPLE SCLEROSIS (HCC): ICD-10-CM

## 2021-04-08 DIAGNOSIS — M79.2 NEUROPATHIC PAIN: Chronic | ICD-10-CM

## 2021-04-08 PROBLEM — G37.3 TRANSVERSE MYELITIS: Chronic | Status: RESOLVED | Noted: 2020-05-19 | Resolved: 2021-04-08

## 2021-04-08 PROCEDURE — 36415 COLL VENOUS BLD VENIPUNCTURE: CPT

## 2021-04-08 PROCEDURE — 85025 COMPLETE CBC W/AUTO DIFF WBC: CPT

## 2021-04-08 PROCEDURE — 80053 COMPREHEN METABOLIC PANEL: CPT

## 2021-04-08 PROCEDURE — 99214 OFFICE O/P EST MOD 30 MIN: CPT | Performed by: PSYCHIATRY & NEUROLOGY

## 2021-04-08 PROCEDURE — 80074 ACUTE HEPATITIS PANEL: CPT

## 2021-04-08 RX ORDER — ACETAMINOPHEN 325 MG/1
650 TABLET ORAL EVERY 6 HOURS PRN
Status: CANCELLED | OUTPATIENT
Start: 2021-04-08

## 2021-04-08 RX ORDER — IBUPROFEN 400 MG/1
400 TABLET ORAL EVERY 6 HOURS PRN
Status: CANCELLED | OUTPATIENT
Start: 2021-04-08

## 2021-04-08 RX ORDER — ACETAMINOPHEN 325 MG/1
650 TABLET ORAL ONCE
Status: CANCELLED | OUTPATIENT
Start: 2021-04-08

## 2021-04-08 RX ORDER — DIPHENHYDRAMINE HYDROCHLORIDE 50 MG/ML
50 INJECTION INTRAMUSCULAR; INTRAVENOUS AS NEEDED
Status: CANCELLED | OUTPATIENT
Start: 2021-04-08

## 2021-04-08 RX ORDER — SODIUM CHLORIDE 9 MG/ML
250 INJECTION, SOLUTION INTRAVENOUS ONCE
Status: CANCELLED | OUTPATIENT
Start: 2021-04-08

## 2021-04-08 RX ORDER — MEPERIDINE HYDROCHLORIDE 50 MG/ML
25 INJECTION INTRAMUSCULAR; INTRAVENOUS; SUBCUTANEOUS
Status: CANCELLED | OUTPATIENT
Start: 2021-04-08

## 2021-04-08 RX ORDER — DIPHENHYDRAMINE HYDROCHLORIDE 50 MG/ML
25 INJECTION INTRAMUSCULAR; INTRAVENOUS ONCE
Status: CANCELLED | OUTPATIENT
Start: 2021-04-08

## 2021-04-08 RX ORDER — FAMOTIDINE 10 MG/ML
20 INJECTION, SOLUTION INTRAVENOUS AS NEEDED
Status: CANCELLED | OUTPATIENT
Start: 2021-04-08

## 2021-04-08 NOTE — PROGRESS NOTES
Chief Complaint  transverse myelitis    Subjective          Trisha Fer Payne presents to [facility] for RRMS.    History of Present Illness    21 y.o. female returns in follow up.  Last visit on 2/25/21 ordered MRI B/C/T, labs, pred taper, gbp.    MRI B/C/T, my review of films, 3/26/2021 as compared to  6/3/20 brain multiple new PVWM T2 lesions, new cervical cord lesions C4, C7, T7 cord lesion, no enhancement, small thoracic meningioma      MOG - neg     Balance is off and tends to fall going downstairs.  Sensation has improved.      IUD mirena.     Pt reports over the last two weeks developing ascending numbness.  Started in feet. Progressed to T10.  Last few days developed saddle anesthesia.  LE feel weak and shaking.       Miscarriage Jan 9.         Fatigue is moderate to severe.       Rash resolved with not going out in sun.       Problem history:     Developed ascending numbness from feet to rib cage over 3 weeks. Felt slightly clumsy.  Could not feel shoes or pants.  Decreased LT.       Sx resolved by March 2020.  Episodes of N and weakness in LE lasting for a minute at time.  Flexing head or back provokes sx.       Feet developed numbness after being gone for a few weeks.  Recurred with HA.       Migraines     Flashing light in peripheral of OD.  Frequency once every two weeks.   Moderate intensity.  Last for 1 - 1.5 hours.  Located in band around head.  Quality is a dull ache.      Taking Sumatriptan but cannot tolerate.        Reviewed medical records:     New onset of B LE numbness starting in 11/2019.  Started in toes and ascended up LE.  L > R sx.  Sensation of walking on sand.  Evaluated at  ED and noted to have decreased sensation from waist down.       My review of films:     MRI thoracic spine - T7/8 cord lesion.    MRI Brain/cervical - 1/9/20 tiny T2 lesion R PVWM probable venous anomaly, no cord lesions       4/6/20 reported HA 5 times a week.  Severe intensity, N,  "photophobia,      Evaluation by Dr Navarro NMO, SSA/SSB, HIV, RPR, ACE, ANCA neg  Recommended monitoring for second demyelinating lesion.       Objective   Vital Signs:   /64   Pulse 85   Ht 162.6 cm (64.02\")   Wt 51.7 kg (114 lb)   SpO2 98%   BMI 19.56 kg/m²     Physical Exam  Eyes:      Extraocular Movements: EOM normal.      Pupils: Pupils are equal, round, and reactive to light.   Neurological:      Mental Status: She is oriented to person, place, and time.      Gait: Gait is intact.      Deep Tendon Reflexes: Strength normal.   Psychiatric:         Speech: Speech normal.          Neurologic Exam     Mental Status   Oriented to person, place, and time.   Speech: speech is normal   Level of consciousness: alert  Knowledge: good and consistent with education.   Normal comprehension.     Cranial Nerves   Cranial nerves II through XII intact.     CN II   Visual fields full to confrontation.   Visual acuity: normal  Right visual field deficit: none  Left visual field deficit: none     CN III, IV, VI   Pupils are equal, round, and reactive to light.  Extraocular motions are normal.   Nystagmus: none   Diplopia: none  Ophthalmoparesis: none  Upgaze: normal  Downgaze: normal  Conjugate gaze: present    CN V   Facial sensation intact.   Right corneal reflex: normal  Left corneal reflex: normal    CN VII   Right facial weakness: none  Left facial weakness: none    CN VIII   Hearing: intact    CN IX, X   Palate: symmetric  Right gag reflex: normal  Left gag reflex: normal    CN XI   Right sternocleidomastoid strength: normal  Left sternocleidomastoid strength: normal    CN XII   Tongue: not atrophic  Fasciculations: absent  Tongue deviation: none    Motor Exam   Muscle bulk: normal  Overall muscle tone: normal    Strength   Strength 5/5 throughout.     Sensory Exam   Light touch normal.     Gait, Coordination, and Reflexes     Gait  Gait: normal    Tremor   Resting tremor: absent  Intention tremor: " absent  Action tremor: absent    Reflexes   Reflexes 2+ except as noted.      Result Review :   The following data was reviewed by: Leighton Ty MD on 04/08/2021:      Data reviewed: Radiologic studies MRI Brain/cervical           Assessment and Plan    Diagnoses and all orders for this visit:    1. Multiple sclerosis (CMS/HCC) (Primary)  Assessment & Plan:  New diagnosis of RRMS    Start Ocrevus        Orders:  -     Hepatitis Panel, Acute; Future  -     CBC & Differential; Future  -     Comprehensive Metabolic Panel; Future    2. Neuropathic pain  Assessment & Plan:   mg BID       3. Periodic headache syndrome, not intractable  Assessment & Plan:  Headaches are unchanged.  Continue current treatment regimen.          Other orders  -     sodium chloride 0.9 % infusion 250 mL  -     acetaminophen (TYLENOL) tablet 650 mg  -     diphenhydrAMINE (BENADRYL) injection 25 mg  -     methylPREDNISolone sodium succinate (SOLU-Medrol) 100 mg in sodium chloride 0.9 % 100 mL IVPB  -     Ocrelizumab 300 mg in sodium chloride 0.9 % 250 mL chemo IV  -     acetaminophen (TYLENOL) tablet 650 mg  -     ibuprofen (ADVIL,MOTRIN) tablet 400 mg  -     famotidine (PEPCID) injection 20 mg  -     hydrocortisone sodium succinate (Solu-CORTEF) injection 100 mg  -     diphenhydrAMINE (BENADRYL) injection 50 mg  -     meperidine (DEMEROL) injection 25 mg      Follow Up   Return in about 6 weeks (around 5/20/2021).  Patient was given instructions and counseling regarding her condition or for health maintenance advice. Please see specific information pulled into the AVS if appropriate.

## 2021-04-09 LAB
ALBUMIN SERPL-MCNC: 4.8 G/DL (ref 3.5–5.2)
ALBUMIN/GLOB SERPL: 1.9 G/DL
ALP SERPL-CCNC: 59 U/L (ref 39–117)
ALT SERPL W P-5'-P-CCNC: 16 U/L (ref 1–33)
ANION GAP SERPL CALCULATED.3IONS-SCNC: 10.6 MMOL/L (ref 5–15)
AST SERPL-CCNC: 21 U/L (ref 1–32)
BASOPHILS # BLD AUTO: 0.05 10*3/MM3 (ref 0–0.2)
BASOPHILS NFR BLD AUTO: 0.4 % (ref 0–1.5)
BILIRUB SERPL-MCNC: 1.8 MG/DL (ref 0–1.2)
BUN SERPL-MCNC: 13 MG/DL (ref 6–20)
BUN/CREAT SERPL: 16.9 (ref 7–25)
CALCIUM SPEC-SCNC: 9.5 MG/DL (ref 8.6–10.5)
CHLORIDE SERPL-SCNC: 104 MMOL/L (ref 98–107)
CO2 SERPL-SCNC: 27.4 MMOL/L (ref 22–29)
CREAT SERPL-MCNC: 0.77 MG/DL (ref 0.57–1)
DEPRECATED RDW RBC AUTO: 35.6 FL (ref 37–54)
EOSINOPHIL # BLD AUTO: 0.09 10*3/MM3 (ref 0–0.4)
EOSINOPHIL NFR BLD AUTO: 0.8 % (ref 0.3–6.2)
ERYTHROCYTE [DISTWIDTH] IN BLOOD BY AUTOMATED COUNT: 12.3 % (ref 12.3–15.4)
GFR SERPL CREATININE-BSD FRML MDRD: 95 ML/MIN/1.73
GLOBULIN UR ELPH-MCNC: 2.5 GM/DL
GLUCOSE SERPL-MCNC: 86 MG/DL (ref 65–99)
HAV IGM SERPL QL IA: NORMAL
HBV CORE IGM SERPL QL IA: NORMAL
HBV SURFACE AG SERPL QL IA: NORMAL
HCT VFR BLD AUTO: 42 % (ref 34–46.6)
HCV AB SER DONR QL: NORMAL
HGB BLD-MCNC: 13.5 G/DL (ref 12–15.9)
IMM GRANULOCYTES # BLD AUTO: 0.04 10*3/MM3 (ref 0–0.05)
IMM GRANULOCYTES NFR BLD AUTO: 0.3 % (ref 0–0.5)
LYMPHOCYTES # BLD AUTO: 3.43 10*3/MM3 (ref 0.7–3.1)
LYMPHOCYTES NFR BLD AUTO: 29.8 % (ref 19.6–45.3)
MCH RBC QN AUTO: 26 PG (ref 26.6–33)
MCHC RBC AUTO-ENTMCNC: 32.1 G/DL (ref 31.5–35.7)
MCV RBC AUTO: 80.8 FL (ref 79–97)
MONOCYTES # BLD AUTO: 1.01 10*3/MM3 (ref 0.1–0.9)
MONOCYTES NFR BLD AUTO: 8.8 % (ref 5–12)
NEUTROPHILS NFR BLD AUTO: 59.9 % (ref 42.7–76)
NEUTROPHILS NFR BLD AUTO: 6.88 10*3/MM3 (ref 1.7–7)
NRBC BLD AUTO-RTO: 0 /100 WBC (ref 0–0.2)
PLATELET # BLD AUTO: 268 10*3/MM3 (ref 140–450)
PMV BLD AUTO: 11.7 FL (ref 6–12)
POTASSIUM SERPL-SCNC: 3.6 MMOL/L (ref 3.5–5.2)
PROT SERPL-MCNC: 7.3 G/DL (ref 6–8.5)
RBC # BLD AUTO: 5.2 10*6/MM3 (ref 3.77–5.28)
SODIUM SERPL-SCNC: 142 MMOL/L (ref 136–145)
WBC # BLD AUTO: 11.5 10*3/MM3 (ref 3.4–10.8)

## 2021-04-13 ENCOUNTER — TELEPHONE (OUTPATIENT)
Dept: NEUROLOGY | Facility: CLINIC | Age: 21
End: 2021-04-13

## 2021-04-13 NOTE — TELEPHONE ENCOUNTER
Caller: JIL GIFFORD    Relationship to patient: SELF     Best call back number: [phone]    Chief complaint: SCHEDULE INFUSION  Type of visit: INFUSION    Requested date:ASAP     Additional notes:PT CALLED IN TO GET SET UP FOR OCREVUS INFUSION. PER HUB PROTOCOL, SENDING ENCOUNTER FOR SCHEDULING.     PLEASE REVIEW AND ADVISE

## 2021-04-15 ENCOUNTER — INFUSION (OUTPATIENT)
Dept: ONCOLOGY | Facility: HOSPITAL | Age: 21
End: 2021-04-15

## 2021-04-15 VITALS
TEMPERATURE: 97.1 F | HEART RATE: 109 BPM | RESPIRATION RATE: 16 BRPM | DIASTOLIC BLOOD PRESSURE: 71 MMHG | SYSTOLIC BLOOD PRESSURE: 121 MMHG

## 2021-04-15 DIAGNOSIS — G35 MULTIPLE SCLEROSIS (HCC): Primary | ICD-10-CM

## 2021-04-15 PROCEDURE — 96366 THER/PROPH/DIAG IV INF ADDON: CPT

## 2021-04-15 PROCEDURE — 25010000002 METHYLPREDNISOLONE PER 125 MG: Performed by: PSYCHIATRY & NEUROLOGY

## 2021-04-15 PROCEDURE — 25010000002 DIPHENHYDRAMINE PER 50 MG: Performed by: PSYCHIATRY & NEUROLOGY

## 2021-04-15 PROCEDURE — 96375 TX/PRO/DX INJ NEW DRUG ADDON: CPT

## 2021-04-15 PROCEDURE — 96365 THER/PROPH/DIAG IV INF INIT: CPT

## 2021-04-15 PROCEDURE — 25010000002 OCRELIZUMAB 300 MG/10ML SOLUTION 300 MG VIAL: Performed by: PSYCHIATRY & NEUROLOGY

## 2021-04-15 PROCEDURE — 96376 TX/PRO/DX INJ SAME DRUG ADON: CPT

## 2021-04-15 RX ORDER — DIPHENHYDRAMINE HYDROCHLORIDE 50 MG/ML
25 INJECTION INTRAMUSCULAR; INTRAVENOUS ONCE
Status: COMPLETED | OUTPATIENT
Start: 2021-04-15 | End: 2021-04-15

## 2021-04-15 RX ORDER — METHYLPREDNISOLONE SODIUM SUCCINATE 125 MG/2ML
100 INJECTION, POWDER, LYOPHILIZED, FOR SOLUTION INTRAMUSCULAR; INTRAVENOUS ONCE
Status: CANCELLED | OUTPATIENT
Start: 2021-04-29

## 2021-04-15 RX ORDER — DIPHENHYDRAMINE HYDROCHLORIDE 50 MG/ML
50 INJECTION INTRAMUSCULAR; INTRAVENOUS AS NEEDED
Status: CANCELLED | OUTPATIENT
Start: 2021-04-29

## 2021-04-15 RX ORDER — ACETAMINOPHEN 325 MG/1
650 TABLET ORAL ONCE
Status: COMPLETED | OUTPATIENT
Start: 2021-04-15 | End: 2021-04-15

## 2021-04-15 RX ORDER — FAMOTIDINE 10 MG/ML
20 INJECTION, SOLUTION INTRAVENOUS AS NEEDED
Status: CANCELLED | OUTPATIENT
Start: 2021-04-29

## 2021-04-15 RX ORDER — ACETAMINOPHEN 325 MG/1
650 TABLET ORAL EVERY 6 HOURS PRN
Status: CANCELLED | OUTPATIENT
Start: 2021-04-29

## 2021-04-15 RX ORDER — IBUPROFEN 400 MG/1
400 TABLET ORAL EVERY 6 HOURS PRN
Status: CANCELLED | OUTPATIENT
Start: 2021-04-29

## 2021-04-15 RX ORDER — SODIUM CHLORIDE 9 MG/ML
250 INJECTION, SOLUTION INTRAVENOUS ONCE
Status: CANCELLED | OUTPATIENT
Start: 2021-04-29

## 2021-04-15 RX ORDER — MEPERIDINE HYDROCHLORIDE 50 MG/ML
25 INJECTION INTRAMUSCULAR; INTRAVENOUS; SUBCUTANEOUS
Status: CANCELLED | OUTPATIENT
Start: 2021-04-29

## 2021-04-15 RX ORDER — METHYLPREDNISOLONE SODIUM SUCCINATE 125 MG/2ML
100 INJECTION, POWDER, LYOPHILIZED, FOR SOLUTION INTRAMUSCULAR; INTRAVENOUS ONCE
Status: COMPLETED | OUTPATIENT
Start: 2021-04-15 | End: 2021-04-15

## 2021-04-15 RX ORDER — SODIUM CHLORIDE 9 MG/ML
250 INJECTION, SOLUTION INTRAVENOUS ONCE
Status: COMPLETED | OUTPATIENT
Start: 2021-04-15 | End: 2021-04-15

## 2021-04-15 RX ORDER — ACETAMINOPHEN 325 MG/1
650 TABLET ORAL ONCE
Status: CANCELLED | OUTPATIENT
Start: 2021-04-29

## 2021-04-15 RX ORDER — DIPHENHYDRAMINE HYDROCHLORIDE 50 MG/ML
25 INJECTION INTRAMUSCULAR; INTRAVENOUS ONCE
Status: CANCELLED | OUTPATIENT
Start: 2021-04-29

## 2021-04-15 RX ORDER — FAMOTIDINE 10 MG/ML
20 INJECTION, SOLUTION INTRAVENOUS AS NEEDED
Status: DISCONTINUED | OUTPATIENT
Start: 2021-04-15 | End: 2021-04-15 | Stop reason: HOSPADM

## 2021-04-15 RX ADMIN — OCRELIZUMAB 300 MG: 300 INJECTION INTRAVENOUS at 10:38

## 2021-04-15 RX ADMIN — DIPHENHYDRAMINE HYDROCHLORIDE 50 MG: 50 INJECTION INTRAMUSCULAR; INTRAVENOUS at 12:12

## 2021-04-15 RX ADMIN — ACETAMINOPHEN 650 MG: 325 TABLET ORAL at 09:59

## 2021-04-15 RX ADMIN — METHYLPREDNISOLONE SODIUM SUCCINATE 100 MG: 125 INJECTION, POWDER, FOR SOLUTION INTRAMUSCULAR; INTRAVENOUS at 09:57

## 2021-04-15 RX ADMIN — SODIUM CHLORIDE 500 ML: 9 INJECTION, SOLUTION INTRAVENOUS at 10:38

## 2021-04-15 RX ADMIN — FAMOTIDINE 20 MG: 10 INJECTION INTRAVENOUS at 12:12

## 2021-04-15 RX ADMIN — DIPHENHYDRAMINE HYDROCHLORIDE 25 MG: 50 INJECTION INTRAMUSCULAR; INTRAVENOUS at 09:55

## 2021-04-15 NOTE — PROGRESS NOTES
"1205-Pt reported throat \"itch\"; denies sob, chest pain, no rash/hives noted.  Ocrevus stopped, NS infusing.  Benadryl 50mg/Pepcid 20mg given.  Symptoms resolved, Ocrevus restarted at 45ml/hr.    "

## 2021-04-20 ENCOUNTER — TELEPHONE (OUTPATIENT)
Dept: NEUROLOGY | Facility: CLINIC | Age: 21
End: 2021-04-20

## 2021-04-20 RX ORDER — ONDANSETRON 4 MG/1
4 TABLET, FILM COATED ORAL EVERY 8 HOURS PRN
Qty: 30 TABLET | Refills: 1 | Status: SHIPPED | OUTPATIENT
Start: 2021-04-20 | End: 2021-05-17

## 2021-04-20 NOTE — TELEPHONE ENCOUNTER
Caller: Trisha Payne Maury    Relationship: Self    Best call back number: [phone]    What medications are you currently taking:   Current Outpatient Medications on File Prior to Visit   Medication Sig Dispense Refill   • gabapentin (Neurontin) 100 MG capsule Take 1 capsule by mouth 2 (two) times a day. 60 capsule 6   • propranolol (INDERAL) 10 MG tablet TAKE 1 TO 2 TABLETS BY MOUTH TWICE DAILY AS NEEDED FOR ANXIETY       No current facility-administered medications on file prior to visit.        When did you start taking these medications: 04.15.21    Which medication are you concerned about: OCREVUS INFUSSION     Who prescribed you this medication:  DR CALVIN    What are your concerns: NAUSEA ,THOWING UP  SINCE 04.16.21 PT WOULD LIKE TO KNOW IF YOU CAN PRESCRIBE FOR NAUSEA     [facility] [address] [place] [address] [phone] [facility] [phone]   [phone]  How long have you been taking these medications: 04.15.21    How long have you had these concerns:NA

## 2021-04-20 NOTE — TELEPHONE ENCOUNTER
Called patient. She states she has been throwing up at least every other day, gets nauseated after eating and does not have much of an appetite. Patient stated she did notice a little fever the day after infusion but has not had another since. Just having a lot of nausea, able to get fluids down most of the time. Also, states she is having a headache/migraine that comes and goes, she has been taking Excedrin Migraine. Wanting to know if she can get some medication to help control nausea?

## 2021-04-21 NOTE — TELEPHONE ENCOUNTER
Provider: DR. CALVIN  Caller: JIL GIFFORD  Relationship to Patient: SELF  Phone Number: [phone]  Reason for Call: PT CALLED TO SPEAK WITH CLINICAL STAFF. PT STATES SHE IS DUE FOR HER NEXT TDAP IMMUNIZATION AND WANTED TO CHECK WITH CLINICAL STAFF IF IT WOULD OR WOULD NOT BE SAFE FOR PT TO RECEIVE CONSIDERING SHE JUST RECENTLY HAS HER OCREVUS INFUSION LAST Thursday, 4/15/21.    PLEASE REVIEW AND ADVISE.

## 2021-04-22 NOTE — TELEPHONE ENCOUNTER
Patient needs a letter for school/clinicals stating she needs to hold off on current vaccinations, you ok if I write letter?

## 2021-04-29 ENCOUNTER — INFUSION (OUTPATIENT)
Dept: ONCOLOGY | Facility: HOSPITAL | Age: 21
End: 2021-04-29

## 2021-04-29 VITALS
RESPIRATION RATE: 16 BRPM | DIASTOLIC BLOOD PRESSURE: 54 MMHG | HEART RATE: 94 BPM | TEMPERATURE: 96.8 F | SYSTOLIC BLOOD PRESSURE: 112 MMHG

## 2021-04-29 DIAGNOSIS — G35 MULTIPLE SCLEROSIS (HCC): Primary | ICD-10-CM

## 2021-04-29 PROCEDURE — 96366 THER/PROPH/DIAG IV INF ADDON: CPT

## 2021-04-29 PROCEDURE — 25010000002 DIPHENHYDRAMINE PER 50 MG: Performed by: PSYCHIATRY & NEUROLOGY

## 2021-04-29 PROCEDURE — 25010000002 METHYLPREDNISOLONE PER 125 MG: Performed by: PSYCHIATRY & NEUROLOGY

## 2021-04-29 PROCEDURE — 96365 THER/PROPH/DIAG IV INF INIT: CPT

## 2021-04-29 PROCEDURE — 96375 TX/PRO/DX INJ NEW DRUG ADDON: CPT

## 2021-04-29 PROCEDURE — 25010000002 OCRELIZUMAB 300 MG/10ML SOLUTION 300 MG VIAL: Performed by: PSYCHIATRY & NEUROLOGY

## 2021-04-29 RX ORDER — DIPHENHYDRAMINE HYDROCHLORIDE 50 MG/ML
25 INJECTION INTRAMUSCULAR; INTRAVENOUS ONCE
Status: CANCELLED | OUTPATIENT
Start: 2021-04-29

## 2021-04-29 RX ORDER — METHYLPREDNISOLONE SODIUM SUCCINATE 125 MG/2ML
100 INJECTION, POWDER, LYOPHILIZED, FOR SOLUTION INTRAMUSCULAR; INTRAVENOUS ONCE
Status: CANCELLED | OUTPATIENT
Start: 2021-04-29

## 2021-04-29 RX ORDER — DIPHENHYDRAMINE HYDROCHLORIDE 50 MG/ML
50 INJECTION INTRAMUSCULAR; INTRAVENOUS AS NEEDED
Status: CANCELLED | OUTPATIENT
Start: 2021-04-29

## 2021-04-29 RX ORDER — DIPHENHYDRAMINE HYDROCHLORIDE 50 MG/ML
25 INJECTION INTRAMUSCULAR; INTRAVENOUS ONCE
Status: COMPLETED | OUTPATIENT
Start: 2021-04-29 | End: 2021-04-29

## 2021-04-29 RX ORDER — MEPERIDINE HYDROCHLORIDE 50 MG/ML
25 INJECTION INTRAMUSCULAR; INTRAVENOUS; SUBCUTANEOUS
Status: CANCELLED | OUTPATIENT
Start: 2021-04-29

## 2021-04-29 RX ORDER — ACETAMINOPHEN 325 MG/1
650 TABLET ORAL ONCE
Status: CANCELLED | OUTPATIENT
Start: 2021-04-29

## 2021-04-29 RX ORDER — ACETAMINOPHEN 325 MG/1
650 TABLET ORAL ONCE
Status: COMPLETED | OUTPATIENT
Start: 2021-04-29 | End: 2021-04-29

## 2021-04-29 RX ORDER — SODIUM CHLORIDE 9 MG/ML
250 INJECTION, SOLUTION INTRAVENOUS ONCE
Status: CANCELLED | OUTPATIENT
Start: 2021-04-29

## 2021-04-29 RX ORDER — IBUPROFEN 400 MG/1
400 TABLET ORAL EVERY 6 HOURS PRN
Status: CANCELLED | OUTPATIENT
Start: 2021-04-29

## 2021-04-29 RX ORDER — ACETAMINOPHEN 325 MG/1
650 TABLET ORAL EVERY 6 HOURS PRN
Status: CANCELLED | OUTPATIENT
Start: 2021-04-29

## 2021-04-29 RX ORDER — FAMOTIDINE 10 MG/ML
20 INJECTION, SOLUTION INTRAVENOUS AS NEEDED
Status: CANCELLED | OUTPATIENT
Start: 2021-04-29

## 2021-04-29 RX ORDER — METHYLPREDNISOLONE SODIUM SUCCINATE 125 MG/2ML
100 INJECTION, POWDER, LYOPHILIZED, FOR SOLUTION INTRAMUSCULAR; INTRAVENOUS ONCE
Status: COMPLETED | OUTPATIENT
Start: 2021-04-29 | End: 2021-04-29

## 2021-04-29 RX ORDER — SODIUM CHLORIDE 9 MG/ML
250 INJECTION, SOLUTION INTRAVENOUS ONCE
Status: COMPLETED | OUTPATIENT
Start: 2021-04-29 | End: 2021-04-29

## 2021-04-29 RX ADMIN — OCRELIZUMAB 300 MG: 300 INJECTION INTRAVENOUS at 10:21

## 2021-04-29 RX ADMIN — METHYLPREDNISOLONE SODIUM SUCCINATE 100 MG: 125 INJECTION, POWDER, FOR SOLUTION INTRAMUSCULAR; INTRAVENOUS at 09:41

## 2021-04-29 RX ADMIN — ACETAMINOPHEN 650 MG: 325 TABLET ORAL at 09:40

## 2021-04-29 RX ADMIN — SODIUM CHLORIDE 250 ML: 9 INJECTION, SOLUTION INTRAVENOUS at 09:40

## 2021-04-29 RX ADMIN — DIPHENHYDRAMINE HYDROCHLORIDE 25 MG: 50 INJECTION INTRAMUSCULAR; INTRAVENOUS at 09:40

## 2021-05-17 ENCOUNTER — OFFICE VISIT (OUTPATIENT)
Dept: NEUROLOGY | Facility: CLINIC | Age: 21
End: 2021-05-17

## 2021-05-17 VITALS
OXYGEN SATURATION: 92 % | HEART RATE: 61 BPM | BODY MASS INDEX: 19.6 KG/M2 | HEIGHT: 64 IN | DIASTOLIC BLOOD PRESSURE: 64 MMHG | RESPIRATION RATE: 20 BRPM | WEIGHT: 114.8 LBS | SYSTOLIC BLOOD PRESSURE: 120 MMHG

## 2021-05-17 DIAGNOSIS — G43.C0 PERIODIC HEADACHE SYNDROME, NOT INTRACTABLE: Chronic | ICD-10-CM

## 2021-05-17 DIAGNOSIS — M79.2 NEUROPATHIC PAIN: Chronic | ICD-10-CM

## 2021-05-17 DIAGNOSIS — G35 MULTIPLE SCLEROSIS (HCC): Primary | Chronic | ICD-10-CM

## 2021-05-17 DIAGNOSIS — F33.1 MODERATE EPISODE OF RECURRENT MAJOR DEPRESSIVE DISORDER (HCC): ICD-10-CM

## 2021-05-17 PROCEDURE — 99214 OFFICE O/P EST MOD 30 MIN: CPT | Performed by: PSYCHIATRY & NEUROLOGY

## 2021-05-17 RX ORDER — LAMOTRIGINE 25 MG/1
TABLET ORAL
Qty: 120 TABLET | Refills: 2 | Status: SHIPPED | OUTPATIENT
Start: 2021-05-17 | End: 2021-07-12

## 2021-05-17 RX ORDER — GABAPENTIN 100 MG/1
100 CAPSULE ORAL 2 TIMES DAILY
Qty: 60 CAPSULE | Refills: 6 | Status: SHIPPED | OUTPATIENT
Start: 2021-05-17 | End: 2021-10-15

## 2021-05-17 NOTE — PROGRESS NOTES
Chief Complaint  Multiple Sclerosis    Subjective          Burnside Fer Payne presents to [facility] NEUROLOGY     History of Present Illness    21 y.o. female returns in follow up.  Last visit on 4/8/21 rx Ocrevus, ordered labs.      Ocrevus 4/15/21 and 4/29/21    Pain in hips continues at night.  Exacerbated by being on feet all day.    Fatigue is moderate.      Problem history:    MRI B/C/T 3/26/2021 as compared to  6/3/20 brain multiple new PVWM T2 lesions, new cervical cord lesions C4, C7, T7 cord lesion, no enhancement, small thoracic meningioma      MOG - neg     Balance is off and tends to fall going downstairs.  Sensation has improved.       IUD mirena.      Pt reports over the last two weeks developing ascending numbness.  Started in feet. Progressed to T10.  Last few days developed saddle anesthesia.  LE feel weak and shaking.       Miscarriage Jan 9.         Fatigue is moderate to severe.       Rash resolved with not going out in sun.       Problem history:     Developed ascending numbness from feet to rib cage over 3 weeks. Felt slightly clumsy.  Could not feel shoes or pants.  Decreased LT.       Sx resolved by March 2020.  Episodes of N and weakness in LE lasting for a minute at time.  Flexing head or back provokes sx.       Feet developed numbness after being gone for a few weeks. Recurred with HA.       Migraines     Flashing light in peripheral of OD.  Frequency once every two weeks.   Moderate intensity.  Last for 1 - 1.5 hours.  Located in band around head.  Quality is a dull ache.      Taking Sumatriptan but cannot tolerate.        Reviewed medical records:     New onset of B LE numbness starting in 11/2019.  Started in toes and ascended up LE.  L > R sx.  Sensation of walking on sand.  Evaluated at  ED and noted to have decreased sensation from waist down.       My review of films:     MRI thoracic spine - T7/8 cord lesion.    MRI Brain/cervical - 1/9/20 tiny T2 lesion R  "PVWM probable venous anomaly, no cord lesions       4/6/20 reported HA 5 times a week.  Severe intensity, N, photophobia,      Evaluation by Dr Navarro NMO, SSA/SSB, HIV, RPR, ACE, ANCA neg  Recommended monitoring for second demyelinating lesion.           Objective   Vital Signs:   /64   Pulse 61   Resp 20   Ht 162.6 cm (64.02\")   Wt 52.1 kg (114 lb 12.8 oz)   SpO2 92%   BMI 19.70 kg/m²     Physical Exam   Result Review :   The following data was reviewed by: Leighton Ty MD on 05/17/2021:  Common labs    Common Labsle 4/8/21 4/8/21    1519 1519   Glucose  86   BUN  13   Creatinine  0.77   eGFR Non African Am  95   Sodium  142   Potassium  3.6   Chloride  104   Calcium  9.5   Albumin  4.80   Total Bilirubin  1.8 (A)   Alkaline Phosphatase  59   AST (SGOT)  21   ALT (SGPT)  16   WBC 11.50 (A)    Hemoglobin 13.5    Hematocrit 42.0    Platelets 268    (A) Abnormal value       Comments are available for some flowsheets but are not being displayed.                     Assessment and Plan    Diagnoses and all orders for this visit:    1. Multiple sclerosis (CMS/HCC) (Primary)  Assessment & Plan:  Tolerating Ocrevus          2. Neuropathic pain  Assessment & Plan:   mg BID    Add LTG       Orders:  -     gabapentin (Neurontin) 100 MG capsule; Take 1 capsule by mouth 2 (two) times a day.  Dispense: 60 capsule; Refill: 6    3. Periodic headache syndrome, not intractable    4. Moderate episode of recurrent major depressive disorder (CMS/HCC)    Other orders  -     lamoTRIgine (LaMICtal) 25 MG tablet; Take one tablet twice a day for one week, then increase two tablets twice a day  Dispense: 120 tablet; Refill: 2      Follow Up   No follow-ups on file.  Patient was given instructions and counseling regarding her condition or for health maintenance advice. Please see specific information pulled into the AVS if appropriate.       "

## 2021-07-12 ENCOUNTER — OFFICE VISIT (OUTPATIENT)
Dept: NEUROLOGY | Facility: CLINIC | Age: 21
End: 2021-07-12

## 2021-07-12 VITALS
OXYGEN SATURATION: 99 % | HEIGHT: 64 IN | SYSTOLIC BLOOD PRESSURE: 116 MMHG | HEART RATE: 102 BPM | DIASTOLIC BLOOD PRESSURE: 62 MMHG | WEIGHT: 113.8 LBS | BODY MASS INDEX: 19.43 KG/M2

## 2021-07-12 DIAGNOSIS — M79.2 NEUROPATHIC PAIN: Chronic | ICD-10-CM

## 2021-07-12 DIAGNOSIS — G43.C0 PERIODIC HEADACHE SYNDROME, NOT INTRACTABLE: Chronic | ICD-10-CM

## 2021-07-12 DIAGNOSIS — F31.9 BIPOLAR 1 DISORDER (HCC): ICD-10-CM

## 2021-07-12 DIAGNOSIS — R68.82 DECREASED LIBIDO: ICD-10-CM

## 2021-07-12 DIAGNOSIS — R53.82 CHRONIC FATIGUE: ICD-10-CM

## 2021-07-12 DIAGNOSIS — G35 MULTIPLE SCLEROSIS (HCC): Primary | Chronic | ICD-10-CM

## 2021-07-12 PROCEDURE — 99214 OFFICE O/P EST MOD 30 MIN: CPT | Performed by: NURSE PRACTITIONER

## 2021-07-12 RX ORDER — ARMODAFINIL 250 MG/1
250 TABLET ORAL DAILY
Qty: 30 TABLET | Refills: 5 | Status: SHIPPED | OUTPATIENT
Start: 2021-07-12 | End: 2021-07-22 | Stop reason: SINTOL

## 2021-07-12 NOTE — PROGRESS NOTES
Subjective:     Patient ID: Trisha Payne is a 21 y.o. female.    CC:   Chief Complaint   Patient presents with   • Multiple Sclerosis       HPI:   History of Present Illness     Last visit 5/17/21 w Dr Ty RRMS-cont Ocrevus, Neuropathic pain cont  BID and add LTG 50 bid    RRMS  MS clinic today  Last Ocrevus infusion 4/15/21 Next 10/14/21. First infusion with scratchy throat and heavy breathing. Had a hot flash with 2nd infusion and felt sick for 2 weeks with nausea. Those symptoms are now resolved.   Last fall was more than a month ago. Couldn't feel her feet.    Biggest complaint is fatigue. Wants to sleep all the times. Denies depression. Fatigue is severe. Previously on Nuvigil.    No dysphagia, has heat sensitivity. Sun causes blisters. Going to tanning bed. Going to the beach in July. Has stress incontinence but that is chronic. No constipation.     Pain in back and wraps to stomach and toes. Treated w GBP and will get relief. Worse if sitting too long.    Pain in hips at night worse after being on feet all day. Fatigue is moderate. Poor balance, falling on stairs. IUD mirena. Ascending numbness feet to T10 and saddle anesthesia. LE weak    Decreased libido. Using Mirena. Denies saddle paresthesia, but complains of vaginal dryness. Able to achieve orgasm.      MDD  Moods are labile. She is bipolar.  Did not take Lamictal.  Will become angry easily. She can calm herself down. She is self aware and self-regulating. Last medicated for bipolar in 2016.  Denies PBA symptoms.     Tremor  Has propranolol 10mg taking bid for anxiety and tremor    Migraines  Flashing light in peripheral vision OD. Freq every 2 weeks. Mod intensity for 1-1.5 hrs. Band like location with dull quality.  Treated with Excedrin.    Abortives: Sumatriptan-SE      Problem history:     MRI B/C/T 3/26/2021 as compared to  6/3/20 brain multiple new PVWM T2 lesions, new cervical cord lesions C4, C7, T7 cord lesion, no  "enhancement, small thoracic meningioma      MOG - neg      The following portions of the patient's history were reviewed and updated as appropriate: allergies, current medications, past family history, past medical history, past social history, past surgical history and problem list.      Current Outpatient Medications:   •  gabapentin (Neurontin) 100 MG capsule, Take 1 capsule by mouth 2 (two) times a day. (Patient taking differently: Take 100 mg by mouth 2 (Two) Times a Day As Needed.), Disp: 60 capsule, Rfl: 6  •  levonorgestrel (Mirena, 52 MG,) 20 MCG/24HR IUD, 1 each by Intrauterine route 1 (One) Time., Disp: , Rfl:   •  propranolol (INDERAL) 10 MG tablet, TAKE 1 TO 2 TABLETS BY MOUTH TWICE DAILY AS NEEDED FOR ANXIETY, Disp: , Rfl:   •  Armodafinil (Nuvigil) 250 MG tablet, Take 1 tablet by mouth Daily for 30 days., Disp: 30 tablet, Rfl: 5     Past Medical History:   Diagnosis Date   • Anemia    • Anxiety    • Bipolar disorder (CMS/HCC)    • Depression    • Ovarian cyst        Past Surgical History:   Procedure Laterality Date   • EAR TUBES         Social History     Socioeconomic History   • Marital status: Single     Spouse name: Not on file   • Number of children: Not on file   • Years of education: Not on file   • Highest education level: Not on file   Tobacco Use   • Smoking status: Never Smoker   • Smokeless tobacco: Never Used   Vaping Use   • Vaping Use: Never used   Substance and Sexual Activity   • Alcohol use: No   • Drug use: No   • Sexual activity: Yes     Partners: Male     Birth control/protection: Condom       Family History   Problem Relation Age of Onset   • Hypertension Paternal Grandfather    • Thyroid cancer Maternal Grandmother    • Mental illness Mother    • Migraines Mother    • Diabetes Father           Objective:  /62   Pulse 102   Ht 162.6 cm (64.02\")   Wt 51.6 kg (113 lb 12.8 oz)   SpO2 99%   BMI 19.52 kg/m²     Neurologic Exam     Mental Status   Oriented to person, place, " and time.   Follows 3 step commands.   Attention: normal. Concentration: normal.   Speech: speech is normal   Level of consciousness: alert  Knowledge: consistent with education.   Normal comprehension.     Cranial Nerves     CN III, IV, VI   Pupils are equal, round, and reactive to light.  Right pupil: Accommodation: intact.   Left pupil: Accommodation: intact.   CN III: no CN III palsy  CN VI: no CN VI palsy  Nystagmus: none   Diplopia: none  Upgaze: normal  Downgaze: normal  Conjugate gaze: present    CN VII   Facial expression full, symmetric.     CN VIII   Hearing: intact    CN XII   CN XII normal.     Motor Exam   Muscle bulk: normal  Overall muscle tone: normal  Right arm pronator drift: absent  Left arm pronator drift: absent    Strength   Right biceps: 5/5  Left biceps: 5/5  Right triceps: 5/5  Left triceps: 5/5  Right interossei: 5/5  Left interossei: 5/5  Right quadriceps: 5/5  Left quadriceps: 5/5  Right anterior tibial: 5/5  Left anterior tibial: 5/5  Right posterior tibial: 5/5  Left posterior tibial: 5/5    Sensory Exam   Right leg light touch: decreased from knee  Left leg light touch: decreased from knee    Gait, Coordination, and Reflexes     Gait  Gait: normal    Coordination   Romberg: negative  Finger to nose coordination: normal  Heel to shin coordination: normal  Tandem walking coordination: normal    Tremor   Resting tremor: absent  Action tremor: absent    Reflexes   Right brachioradialis: 2+  Left brachioradialis: 2+  Right biceps: 3+  Left biceps: 3+  Right triceps: 3+  Left triceps: 3+  Right patellar: 2+  Left patellar: 2+  Right achilles: 2+  Left achilles: 2+  Right : 2+  Left : 2+      Physical Exam  Eyes:      Pupils: Pupils are equal, round, and reactive to light.   Neurological:      Mental Status: She is oriented to person, place, and time.      Coordination: Finger-Nose-Finger Test, Heel to Shin Test and Romberg Test normal.      Gait: Gait is intact. Tandem walk normal.       Deep Tendon Reflexes:      Reflex Scores:       Tricep reflexes are 3+ on the right side and 3+ on the left side.       Bicep reflexes are 3+ on the right side and 3+ on the left side.       Brachioradialis reflexes are 2+ on the right side and 2+ on the left side.       Patellar reflexes are 2+ on the right side and 2+ on the left side.       Achilles reflexes are 2+ on the right side and 2+ on the left side.  Psychiatric:         Speech: Speech normal.            Assessment/Plan:        Diagnoses and all orders for this visit:    1. Multiple sclerosis (CMS/HCC) (Primary)  Comments:  Cont Ocrevus  Orders:  -     Ambulatory Referral to Physical Therapy Evaluate and treat  -     Armodafinil (Nuvigil) 250 MG tablet; Take 1 tablet by mouth Daily for 30 days.  Dispense: 30 tablet; Refill: 5    2. Chronic fatigue  Comments:  Add Nuvigil    3. Neuropathic pain  Comments:  Cont GBP    4. Periodic headache syndrome, not intractable    5. Bipolar 1 disorder (CMS/HCC)  Comments:  Watch mood carefully. Offer support and Rx as needed.    6. Decreased libido  Comments:  Discussed etiologies. She will discuss w OB. Likely multifactorial. Use lubricant prn          FU after next infusion.     As a part of this patient's therapy a controlled substance was prescribed. Instructed on the safe and proper use of this medication along with potential risks. Controlled substance contract signed and scanned. Mahamed will be reviewed and UDS obtained as indicated.        Reviewed medications, potential side effects and signs and symptoms to report. Discussed risk versus benefits of treatment plan with patient and/or family-including medications, labs and radiology that may be ordered. Addressed questions and concerns during visit. Patient and/or family verbalized understanding and agree with plan. Instructed to call the office with any questions and report to ER with any life-threatening symptoms.    During this visit the following were  done:  Labs Reviewed []    Labs Ordered []    Radiology Reports Reviewed []    Radiology Ordered []    PCP Records Reviewed []    Referring Provider Records Reviewed []    ER Records Reviewed []    Hospital Records Reviewed []    History Obtained From Family []    Radiology Images Reviewed []    Other Reviewed []    Records Requested []      Torrey Gaspar, DESHAWN, APRN  7/12/2021

## 2021-07-14 DIAGNOSIS — F41.9 ANXIETY: ICD-10-CM

## 2021-07-14 DIAGNOSIS — R25.1 TREMOR: Primary | ICD-10-CM

## 2021-07-14 RX ORDER — PROPRANOLOL HYDROCHLORIDE 10 MG/1
10 TABLET ORAL 2 TIMES DAILY
Qty: 60 TABLET | Refills: 3 | Status: SHIPPED | OUTPATIENT
Start: 2021-07-14 | End: 2021-10-15

## 2021-07-14 NOTE — TELEPHONE ENCOUNTER
Patient is at her [facility] pharmacy currently trying to  her propanolol.    Edgard,  Can we send this in for her?

## 2021-07-19 ENCOUNTER — TELEPHONE (OUTPATIENT)
Dept: NEUROLOGY | Facility: CLINIC | Age: 21
End: 2021-07-19

## 2021-07-19 NOTE — TELEPHONE ENCOUNTER
I called pt. Has had 4 episodes of chest tightness, sob and elevated HR. Started Nuvigil yesterday. States she has had this med in the past and had same symptoms but thought it was a MS hug. Will stop Nuvigil. If symptoms persist instructed her to go to ER. If symptoms will resolve will adalgisa her chart w allergy to Nuvigil.

## 2021-07-19 NOTE — TELEPHONE ENCOUNTER
Patient called in. She restarted her Armodafinil 250 mg over the weekend. She has been experiencing chest tightness, SOB and heart rate 140. Please advise.

## 2021-07-22 ENCOUNTER — TELEPHONE (OUTPATIENT)
Dept: NEUROLOGY | Facility: CLINIC | Age: 21
End: 2021-07-22

## 2021-07-22 DIAGNOSIS — R53.83 OTHER FATIGUE: Primary | ICD-10-CM

## 2021-07-22 DIAGNOSIS — G35 MULTIPLE SCLEROSIS (HCC): ICD-10-CM

## 2021-07-22 RX ORDER — AMANTADINE HYDROCHLORIDE 100 MG/1
100 TABLET ORAL DAILY
Qty: 30 TABLET | Refills: 2 | Status: SHIPPED | OUTPATIENT
Start: 2021-07-22 | End: 2021-10-15

## 2021-07-22 NOTE — TELEPHONE ENCOUNTER
Provider: NICOLA  Caller: PT  Relationship to Patient: SELF  Pharmacy: [facility]  Phone Number: [phone]  Reason for Call: PT IS WANTING A REPLACEMENT MEDICATION FOR THE ARMODAFINIL (NUVIGIL); SHE IS LOOKING FOR SOMETHING TO HELP GIVE MORE ENERGY.    PT IS NO LONGER EXPERIENCING THE SIDE AFFECTS OF RACING HEART SINCE STOPPING THE NUVIGIL.    PLEASE CALL & ADVISE.    THANK YOU.

## 2021-09-02 ENCOUNTER — TELEPHONE (OUTPATIENT)
Dept: NEUROLOGY | Facility: CLINIC | Age: 21
End: 2021-09-02

## 2021-09-02 NOTE — TELEPHONE ENCOUNTER
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 Only 4 of their classmates including Trisha are vaccinated.  When they called her notified although she is vaccinated due to her MS/on immuno suppressant drug she will have to quarantine for 5 days then get tested for COVID and if it is negative then she can go back.    She is wanting to know if we believe this is necessary?     Notified per Arturo she will need to follow the [facility] protocol so we cannot override this and it is probably necessary. She stated understanding just didn't want to miss this much work and put off her 12 hours left of lab time which means starting clinicals will be delayed.     She will call us if she is positive just to make us aware or if it changes up Tx. Thanks!

## 2021-10-12 ENCOUNTER — TELEPHONE (OUTPATIENT)
Dept: NEUROLOGY | Facility: CLINIC | Age: 21
End: 2021-10-12

## 2021-10-12 NOTE — TELEPHONE ENCOUNTER
"Caller: Trisha Payne [place]    Relationship: Self    Best call back number: [phone]    What was the call regarding: PT CALLED TO ASK IF OFFICE FELT IT WAS OKAY FOR HER TO RESTART TAKING THE PROPRANOLOL MEDICATION. PT STATES SHE WAS ADVISED BY THE OFFICE A WHILE AGO TO DISCONTINUE USE OF THE MEDICATION DUE TO \"SEIZURE LIKE SYMPTOMS IN HER CHEST\". PT HAS SINCE FIGURED OUT THAT THE PROPRANOLOL MEDICATION WAS NOT RESPONSIBLE FOR THESE SYMPTOMS. PT STATES HER DEPRESSION IS WORSENING AND WOULD LIKE TO KNOW IF SHE IS OKAY TO RESTART TAKING THE MEDICATION AGAIN.    PT ALSO CONFIRMED HER APPT THIS Friday, 10/15/21. PT COMPLETED PRESCREENING QUESTIONS AS WELL.    Do you require a callback: YES, PLEASE.    PLEASE REVIEW AND ADVISE.      "

## 2021-10-14 RX ORDER — DIPHENHYDRAMINE HYDROCHLORIDE 50 MG/ML
50 INJECTION INTRAMUSCULAR; INTRAVENOUS AS NEEDED
Status: CANCELLED | OUTPATIENT
Start: 2021-10-14

## 2021-10-14 RX ORDER — IBUPROFEN 400 MG/1
400 TABLET ORAL EVERY 6 HOURS PRN
Status: CANCELLED | OUTPATIENT
Start: 2021-10-14

## 2021-10-14 RX ORDER — FAMOTIDINE 10 MG/ML
20 INJECTION, SOLUTION INTRAVENOUS AS NEEDED
Status: CANCELLED | OUTPATIENT
Start: 2021-10-14

## 2021-10-14 RX ORDER — ACETAMINOPHEN 325 MG/1
650 TABLET ORAL ONCE
Status: CANCELLED | OUTPATIENT
Start: 2021-10-14

## 2021-10-14 RX ORDER — DIPHENHYDRAMINE HYDROCHLORIDE 50 MG/ML
25 INJECTION INTRAMUSCULAR; INTRAVENOUS ONCE
Status: CANCELLED | OUTPATIENT
Start: 2021-10-14

## 2021-10-14 RX ORDER — ACETAMINOPHEN 325 MG/1
650 TABLET ORAL EVERY 6 HOURS PRN
Status: CANCELLED | OUTPATIENT
Start: 2021-10-14

## 2021-10-14 RX ORDER — SODIUM CHLORIDE 9 MG/ML
250 INJECTION, SOLUTION INTRAVENOUS ONCE
Status: CANCELLED | OUTPATIENT
Start: 2021-10-14

## 2021-10-15 ENCOUNTER — INFUSION (OUTPATIENT)
Dept: ONCOLOGY | Facility: HOSPITAL | Age: 21
End: 2021-10-15

## 2021-10-15 ENCOUNTER — OFFICE VISIT (OUTPATIENT)
Dept: NEUROLOGY | Facility: CLINIC | Age: 21
End: 2021-10-15

## 2021-10-15 ENCOUNTER — LAB (OUTPATIENT)
Dept: LAB | Facility: HOSPITAL | Age: 21
End: 2021-10-15

## 2021-10-15 VITALS
HEART RATE: 95 BPM | DIASTOLIC BLOOD PRESSURE: 62 MMHG | RESPIRATION RATE: 18 BRPM | TEMPERATURE: 97.8 F | SYSTOLIC BLOOD PRESSURE: 118 MMHG

## 2021-10-15 VITALS
HEIGHT: 64 IN | DIASTOLIC BLOOD PRESSURE: 70 MMHG | OXYGEN SATURATION: 99 % | TEMPERATURE: 98.4 F | WEIGHT: 111.2 LBS | SYSTOLIC BLOOD PRESSURE: 132 MMHG | BODY MASS INDEX: 18.98 KG/M2

## 2021-10-15 DIAGNOSIS — F41.9 ANXIETY: ICD-10-CM

## 2021-10-15 DIAGNOSIS — G35 MULTIPLE SCLEROSIS (HCC): Primary | ICD-10-CM

## 2021-10-15 DIAGNOSIS — G89.29 CHRONIC MIDLINE LOW BACK PAIN WITHOUT SCIATICA: ICD-10-CM

## 2021-10-15 DIAGNOSIS — F33.1 MODERATE RECURRENT MAJOR DEPRESSION (HCC): ICD-10-CM

## 2021-10-15 DIAGNOSIS — F31.9 BIPOLAR 1 DISORDER (HCC): ICD-10-CM

## 2021-10-15 DIAGNOSIS — M54.50 CHRONIC MIDLINE LOW BACK PAIN WITHOUT SCIATICA: ICD-10-CM

## 2021-10-15 DIAGNOSIS — G35 MULTIPLE SCLEROSIS (HCC): ICD-10-CM

## 2021-10-15 DIAGNOSIS — M25.559 HIP PAIN: ICD-10-CM

## 2021-10-15 LAB
BASOPHILS # BLD AUTO: 0.02 10*3/MM3 (ref 0–0.2)
BASOPHILS NFR BLD AUTO: 0.3 % (ref 0–1.5)
DEPRECATED RDW RBC AUTO: 36.7 FL (ref 37–54)
EOSINOPHIL # BLD AUTO: 0 10*3/MM3 (ref 0–0.4)
EOSINOPHIL NFR BLD AUTO: 0 % (ref 0.3–6.2)
ERYTHROCYTE [DISTWIDTH] IN BLOOD BY AUTOMATED COUNT: 12.4 % (ref 12.3–15.4)
HCT VFR BLD AUTO: 44.3 % (ref 34–46.6)
HGB BLD-MCNC: 13.6 G/DL (ref 12–15.9)
IMM GRANULOCYTES # BLD AUTO: 0.02 10*3/MM3 (ref 0–0.05)
IMM GRANULOCYTES NFR BLD AUTO: 0.3 % (ref 0–0.5)
LYMPHOCYTES # BLD AUTO: 0.31 10*3/MM3 (ref 0.7–3.1)
LYMPHOCYTES NFR BLD AUTO: 5.3 % (ref 19.6–45.3)
MCH RBC QN AUTO: 25.1 PG (ref 26.6–33)
MCHC RBC AUTO-ENTMCNC: 30.7 G/DL (ref 31.5–35.7)
MCV RBC AUTO: 81.9 FL (ref 79–97)
MONOCYTES # BLD AUTO: 0.06 10*3/MM3 (ref 0.1–0.9)
MONOCYTES NFR BLD AUTO: 1 % (ref 5–12)
NEUTROPHILS NFR BLD AUTO: 5.39 10*3/MM3 (ref 1.7–7)
NEUTROPHILS NFR BLD AUTO: 93.1 % (ref 42.7–76)
NRBC BLD AUTO-RTO: 0 /100 WBC (ref 0–0.2)
PLATELET # BLD AUTO: 220 10*3/MM3 (ref 140–450)
PMV BLD AUTO: 12.1 FL (ref 6–12)
RBC # BLD AUTO: 5.41 10*6/MM3 (ref 3.77–5.28)
WBC # BLD AUTO: 5.8 10*3/MM3 (ref 3.4–10.8)

## 2021-10-15 PROCEDURE — 96376 TX/PRO/DX INJ SAME DRUG ADON: CPT

## 2021-10-15 PROCEDURE — 25010000002 DIPHENHYDRAMINE PER 50 MG: Performed by: NURSE PRACTITIONER

## 2021-10-15 PROCEDURE — 96366 THER/PROPH/DIAG IV INF ADDON: CPT

## 2021-10-15 PROCEDURE — 96365 THER/PROPH/DIAG IV INF INIT: CPT

## 2021-10-15 PROCEDURE — 25010000002 DIPHENHYDRAMINE PER 50 MG: Performed by: PSYCHIATRY & NEUROLOGY

## 2021-10-15 PROCEDURE — 25010000002 METHYLPREDNISOLONE PER 125 MG: Performed by: NURSE PRACTITIONER

## 2021-10-15 PROCEDURE — 96413 CHEMO IV INFUSION 1 HR: CPT

## 2021-10-15 PROCEDURE — 25010000002 OCRELIZUMAB 300 MG/10ML SOLUTION 300 MG VIAL: Performed by: NURSE PRACTITIONER

## 2021-10-15 PROCEDURE — 96415 CHEMO IV INFUSION ADDL HR: CPT

## 2021-10-15 PROCEDURE — 85025 COMPLETE CBC W/AUTO DIFF WBC: CPT

## 2021-10-15 PROCEDURE — 36415 COLL VENOUS BLD VENIPUNCTURE: CPT

## 2021-10-15 PROCEDURE — 80053 COMPREHEN METABOLIC PANEL: CPT

## 2021-10-15 PROCEDURE — 96375 TX/PRO/DX INJ NEW DRUG ADDON: CPT

## 2021-10-15 PROCEDURE — 99214 OFFICE O/P EST MOD 30 MIN: CPT | Performed by: NURSE PRACTITIONER

## 2021-10-15 RX ORDER — FAMOTIDINE 10 MG/ML
20 INJECTION, SOLUTION INTRAVENOUS AS NEEDED
Status: CANCELLED | OUTPATIENT
Start: 2022-04-15

## 2021-10-15 RX ORDER — METHYLPREDNISOLONE SODIUM SUCCINATE 125 MG/2ML
100 INJECTION, POWDER, LYOPHILIZED, FOR SOLUTION INTRAMUSCULAR; INTRAVENOUS ONCE
Status: CANCELLED | OUTPATIENT
Start: 2022-04-15

## 2021-10-15 RX ORDER — IBUPROFEN 400 MG/1
400 TABLET ORAL EVERY 6 HOURS PRN
Status: CANCELLED | OUTPATIENT
Start: 2022-04-15

## 2021-10-15 RX ORDER — ACETAMINOPHEN 325 MG/1
650 TABLET ORAL ONCE
Status: COMPLETED | OUTPATIENT
Start: 2021-10-15 | End: 2021-10-15

## 2021-10-15 RX ORDER — DULOXETIN HYDROCHLORIDE 30 MG/1
30 CAPSULE, DELAYED RELEASE ORAL DAILY
Qty: 30 CAPSULE | Refills: 2 | Status: SHIPPED | OUTPATIENT
Start: 2021-10-15 | End: 2021-10-19

## 2021-10-15 RX ORDER — SODIUM CHLORIDE 9 MG/ML
250 INJECTION, SOLUTION INTRAVENOUS ONCE
Status: COMPLETED | OUTPATIENT
Start: 2021-10-15 | End: 2021-10-15

## 2021-10-15 RX ORDER — FAMOTIDINE 10 MG/ML
20 INJECTION, SOLUTION INTRAVENOUS AS NEEDED
Status: DISCONTINUED | OUTPATIENT
Start: 2021-10-15 | End: 2021-10-15 | Stop reason: HOSPADM

## 2021-10-15 RX ORDER — DIPHENHYDRAMINE HYDROCHLORIDE 50 MG/ML
25 INJECTION INTRAMUSCULAR; INTRAVENOUS ONCE
Status: CANCELLED | OUTPATIENT
Start: 2022-04-15

## 2021-10-15 RX ORDER — DIPHENHYDRAMINE HYDROCHLORIDE 50 MG/ML
50 INJECTION INTRAMUSCULAR; INTRAVENOUS AS NEEDED
Status: DISCONTINUED | OUTPATIENT
Start: 2021-10-15 | End: 2021-10-15

## 2021-10-15 RX ORDER — SODIUM CHLORIDE 9 MG/ML
250 INJECTION, SOLUTION INTRAVENOUS ONCE
Status: CANCELLED | OUTPATIENT
Start: 2022-04-15

## 2021-10-15 RX ORDER — METHYLPREDNISOLONE SODIUM SUCCINATE 125 MG/2ML
100 INJECTION, POWDER, LYOPHILIZED, FOR SOLUTION INTRAMUSCULAR; INTRAVENOUS ONCE
Status: COMPLETED | OUTPATIENT
Start: 2021-10-15 | End: 2021-10-15

## 2021-10-15 RX ORDER — ACETAMINOPHEN 325 MG/1
650 TABLET ORAL EVERY 6 HOURS PRN
Status: CANCELLED | OUTPATIENT
Start: 2022-04-15

## 2021-10-15 RX ORDER — DIPHENHYDRAMINE HYDROCHLORIDE 50 MG/ML
50 INJECTION INTRAMUSCULAR; INTRAVENOUS AS NEEDED
Status: CANCELLED | OUTPATIENT
Start: 2022-04-15

## 2021-10-15 RX ORDER — ACETAMINOPHEN 325 MG/1
650 TABLET ORAL ONCE
Status: CANCELLED | OUTPATIENT
Start: 2022-04-15

## 2021-10-15 RX ORDER — DIPHENHYDRAMINE HYDROCHLORIDE 50 MG/ML
25 INJECTION INTRAMUSCULAR; INTRAVENOUS ONCE
Status: COMPLETED | OUTPATIENT
Start: 2021-10-15 | End: 2021-10-15

## 2021-10-15 RX ADMIN — ACETAMINOPHEN 650 MG: 325 TABLET ORAL at 09:00

## 2021-10-15 RX ADMIN — SODIUM CHLORIDE 250 ML: 9 INJECTION, SOLUTION INTRAVENOUS at 10:26

## 2021-10-15 RX ADMIN — DIPHENHYDRAMINE HYDROCHLORIDE 25 MG: 50 INJECTION INTRAMUSCULAR; INTRAVENOUS at 09:00

## 2021-10-15 RX ADMIN — METHYLPREDNISOLONE SODIUM SUCCINATE 100 MG: 125 INJECTION, POWDER, FOR SOLUTION INTRAMUSCULAR; INTRAVENOUS at 09:01

## 2021-10-15 RX ADMIN — FAMOTIDINE 20 MG: 10 INJECTION INTRAVENOUS at 10:31

## 2021-10-15 RX ADMIN — DIPHENHYDRAMINE HYDROCHLORIDE: 50 INJECTION INTRAMUSCULAR; INTRAVENOUS at 10:31

## 2021-10-15 RX ADMIN — OCRELIZUMAB 600 MG: 300 INJECTION INTRAVENOUS at 09:42

## 2021-10-15 NOTE — PROGRESS NOTES
Neuro Office Visit      Encounter Date: 10/15/2021   Patient Name: Trisha Payne  : 2000   MRN: [id number]   PCP: none  Chief Complaint:    Chief Complaint   Patient presents with   • Multiple Sclerosis       History of Present Illness: Trisha Payne is a 21 y.o. female who is here today in Neurology for MS    MS  MRI Cervical Spine With & Without Contrast (2021 15:56)  MRI Brain With & Without Contrast (2021 15:55)  MRI Thoracic Spine With & Without Contrast (2021 15:56)    Had her Ocrevus infusion this am. Had reaction of pins and needles in throat with tachycardia. Tx'd successfully with Pepcid.   Next infusion due in April.  Has daily mid to low back pain. Taking IBU 400mg once a week with good relief. Can have mild hip pain. Standing can be her only relief. Still with bilat hand tremor.   Having bilat blurred vision. Has appt with opthalmologist.  No slurred speech or dysphagia. No urinary urgency or hesitancy. No constipation.  No skin irritation.   Has IUD    MDD  Moods are not stable. Diagnosed with bipolar disorder. Feeling depression. Did not take propranolol. Not suicidal. Not sleeping well due to schedule. She is not seeing a psych provider at this time. Previously seen by MODESTO Ramirez      She is losing weight. She is eating healthy meals and snacks. Going to nursing school. Working full-time at [employer].       Problem History  2019 she presented to  ED with complaint of bilateral leg numbness and tingling   2019 she was seen by outpatient neurology where she described similar symptoms ongoing at that time for around 1 month with eventual a sending pattern up to above the umbilicus.    Evaluation by Dr Navarro NMO, SSA/SSB, HIV, RPR, ACE, ANCA neg  Recommended monitoring for second demyelinating lesion  2020 MRI thoracic spine - T7/8 cord lesion.    MRI Brain/cervical - 20 tiny T2 lesion R PVWM probable venous anomaly, no  cord lesions      Sx resolved by March 2020.  Episodes of N and weakness in LE lasting for a minute at time.  Flexing head or back provokes sx.       Diagnosed- MRI B/C/T 3/26/2021 as compared to  6/3/20 brain multiple new PVWM T2 lesions, new cervical cord lesions C4, C7, T7 cord lesion, no enhancement, small thoracic meningioma  MOG -      DMD 4/15/21 Ocrevus  Subjective      Past Medical History:   Past Medical History:   Diagnosis Date   • Anemia    • Anxiety    • Bipolar disorder (HCC)    • Depression    • Ovarian cyst        Past Surgical History:   Past Surgical History:   Procedure Laterality Date   • EAR TUBES         Family History:   Family History   Problem Relation Age of Onset   • Hypertension Paternal Grandfather    • Thyroid cancer Maternal Grandmother    • Mental illness Mother    • Migraines Mother    • Diabetes Father        Social History:   Social History     Socioeconomic History   • Marital status: Single   Tobacco Use   • Smoking status: Never Smoker   • Smokeless tobacco: Never Used   Vaping Use   • Vaping Use: Never used   Substance and Sexual Activity   • Alcohol use: No   • Drug use: No   • Sexual activity: Yes     Partners: Male     Birth control/protection: Condom       Medications:     Current Outpatient Medications:   •  levonorgestrel (Mirena, 52 MG,) 20 MCG/24HR IUD, 1 each by Intrauterine route 1 (One) Time., Disp: , Rfl:   •  DULoxetine (Cymbalta) 30 MG capsule, Take 1 capsule by mouth Daily., Disp: 30 capsule, Rfl: 2  No current facility-administered medications for this visit.    Allergies:   Allergies   Allergen Reactions   • Bactrim [Sulfamethoxazole-Trimethoprim] Rash   • Doxycycline Rash   • Trileptal [Oxcarbazepine] Rash       PHQ-9 Total Score:     STEADI Fall Risk Assessment has not been completed.    Objective     Physical Exam:   Physical Exam  Eyes:      Pupils: Pupils are equal, round, and reactive to light.   Neurological:      Mental Status: She is oriented to  person, place, and time.      Coordination: Finger-Nose-Finger Test, Heel to Shin Test and Romberg Test normal.      Gait: Gait is intact.      Deep Tendon Reflexes:      Reflex Scores:       Tricep reflexes are 2+ on the right side and 2+ on the left side.       Bicep reflexes are 2+ on the right side and 2+ on the left side.       Brachioradialis reflexes are 2+ on the right side and 2+ on the left side.       Patellar reflexes are 3+ on the right side and 3+ on the left side.       Achilles reflexes are 3+ on the right side and 3+ on the left side.  Psychiatric:         Speech: Speech normal.         Neurologic Exam     Mental Status   Oriented to person, place, and time.   Follows 3 step commands.   Attention: normal. Concentration: normal.   Speech: speech is normal   Level of consciousness: alert  Knowledge: consistent with education.   Normal comprehension.     Cranial Nerves     CN III, IV, VI   Pupils are equal, round, and reactive to light.  Right pupil: Accommodation: intact.   Left pupil: Accommodation: intact.   CN III: no CN III palsy  CN VI: no CN VI palsy  Nystagmus: none   Diplopia: none  Upgaze: normal  Downgaze: normal  Conjugate gaze: present    CN VII   Facial expression full, symmetric.     CN VIII   Hearing: intact    CN XII   CN XII normal.     Motor Exam   Muscle bulk: normal  Overall muscle tone: normal    Strength   Right biceps: 5/5  Left biceps: 5/5  Right triceps: 5/5  Left triceps: 5/5  Right interossei: 5/5  Left interossei: 5/5  Right quadriceps: 5/5  Left quadriceps: 5/5  Right anterior tibial: 5/5  Left anterior tibial: 5/5  Right posterior tibial: 5/5  Left posterior tibial: 5/5    Sensory Exam   Light touch normal.     Gait, Coordination, and Reflexes     Gait  Gait: normal    Coordination   Romberg: negative  Finger to nose coordination: normal  Heel to shin coordination: normal    Tremor   Resting tremor: absent  Intention tremor: present  Action tremor: absent    Reflexes  "  Right brachioradialis: 2+  Left brachioradialis: 2+  Right biceps: 2+  Left biceps: 2+  Right triceps: 2+  Left triceps: 2+  Right patellar: 3+  Left patellar: 3+  Right achilles: 3+  Left achilles: 3+  Right : 2+  Left : 2+       Vital Signs:   Vitals:    10/15/21 1312   BP: 132/70   Temp: 98.4 °F (36.9 °C)   TempSrc: Temporal   SpO2: 99%   Weight: 50.4 kg (111 lb 3.2 oz)   Height: 162.6 cm (64\")     Body mass index is 19.09 kg/m².       Assessment / Plan      Assessment/Plan:   Diagnoses and all orders for this visit:    1. Multiple sclerosis (HCC) (Primary)  Comments:  Due for MRI in March Cont Ocrevus  Orders:  -     CBC Auto Differential; Future  -     Comprehensive Metabolic Panel; Future    2. Bipolar 1 disorder (HCC)  -     Ambulatory Referral to Psychiatry    3. Anxiety  -     Ambulatory Referral to Psychiatry    4. Moderate recurrent major depression (HCC)  -     Ambulatory Referral to Psychiatry  -     DULoxetine (Cymbalta) 30 MG capsule; Take 1 capsule by mouth Daily.  Dispense: 30 capsule; Refill: 2    5. Chronic midline low back pain without sciatica  -     XR spine lumbar 2 or 3 vw; Future    6. Hip pain  -     XR hips bilateral w or wo pelvis 3-4 view; Future         Patient Education:   Reviewed medications, potential side effects and signs and symptoms to report. Discussed risk versus benefits of treatment plan with patient and/or family-including medications, labs and radiology that may be ordered. Addressed questions and concerns during visit. Patient and/or family verbalized understanding and agree with plan. Instructed to call the office with any questions and report to ER with any life-threatening symptoms.     Follow Up:   Return in about 8 weeks (around 12/13/2021) for Recheck.    During this visit the following were done:  Labs Reviewed [x]    Labs Ordered [x]    Radiology Reports Reviewed [x]    Radiology Ordered []    PCP Records Reviewed []    Referring Provider Records " Reviewed []    ER Records Reviewed []    Hospital Records Reviewed []    History Obtained From Family []    Radiology Images Reviewed []    Other Reviewed [x]    Records Requested []      Torrey Gaspar, DESHAWN, APRN

## 2021-10-15 NOTE — PROGRESS NOTES
Pt reported scratchy throat at 1024, denies sob, chest pain, difficulty breathing, no rash or hives noted.  Ocrevus stopped, NS infusing, med w/Benadryl 50mg and Pepcid 20mg.  Symptoms completely resolved, Ocrevus restarted at 125ml/hr.  Pt completed infusion without further issue.

## 2021-10-16 LAB
ALBUMIN SERPL-MCNC: 5 G/DL (ref 3.5–5.2)
ALBUMIN/GLOB SERPL: 2.1 G/DL
ALP SERPL-CCNC: 63 U/L (ref 39–117)
ALT SERPL W P-5'-P-CCNC: 15 U/L (ref 1–33)
ANION GAP SERPL CALCULATED.3IONS-SCNC: 13.3 MMOL/L (ref 5–15)
AST SERPL-CCNC: 17 U/L (ref 1–32)
BILIRUB SERPL-MCNC: 1.2 MG/DL (ref 0–1.2)
BUN SERPL-MCNC: 7 MG/DL (ref 6–20)
BUN/CREAT SERPL: 8.9 (ref 7–25)
CALCIUM SPEC-SCNC: 9.5 MG/DL (ref 8.6–10.5)
CHLORIDE SERPL-SCNC: 105 MMOL/L (ref 98–107)
CO2 SERPL-SCNC: 23.7 MMOL/L (ref 22–29)
CREAT SERPL-MCNC: 0.79 MG/DL (ref 0.57–1)
GFR SERPL CREATININE-BSD FRML MDRD: 92 ML/MIN/1.73
GLOBULIN UR ELPH-MCNC: 2.4 GM/DL
GLUCOSE SERPL-MCNC: 137 MG/DL (ref 65–99)
POTASSIUM SERPL-SCNC: 4.4 MMOL/L (ref 3.5–5.2)
PROT SERPL-MCNC: 7.4 G/DL (ref 6–8.5)
SODIUM SERPL-SCNC: 142 MMOL/L (ref 136–145)

## 2021-10-18 ENCOUNTER — TELEPHONE (OUTPATIENT)
Dept: INTERNAL MEDICINE | Facility: CLINIC | Age: 21
End: 2021-10-18

## 2021-10-18 NOTE — TELEPHONE ENCOUNTER
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   I can find nothing within these parameters that is available without permission to open slot. [facility] is overbooked almost every day for the next 3 weeks  Referrals has asked that we try to get pt in as soon as possible.

## 2021-10-18 NOTE — TELEPHONE ENCOUNTER
Found opening tomorrow at 5 PM that would accommodate patient's schedule. Patient is agreeable to come in tomorrow @ 5 PM.

## 2021-10-18 NOTE — TELEPHONE ENCOUNTER
I don't see anything with those parameters either. If it is urgent she may want to weigh the importance of the appt. Can we get her in at the other [facility] office with one of the NP's there any sooner?

## 2021-10-19 ENCOUNTER — OFFICE VISIT (OUTPATIENT)
Dept: BEHAVIORAL HEALTH | Facility: CLINIC | Age: 21
End: 2021-10-19

## 2021-10-19 ENCOUNTER — TELEPHONE (OUTPATIENT)
Dept: NEUROLOGY | Facility: CLINIC | Age: 21
End: 2021-10-19

## 2021-10-19 VITALS
BODY MASS INDEX: 19.12 KG/M2 | WEIGHT: 112 LBS | HEIGHT: 64 IN | SYSTOLIC BLOOD PRESSURE: 98 MMHG | DIASTOLIC BLOOD PRESSURE: 58 MMHG

## 2021-10-19 DIAGNOSIS — F31.60 BIPOLAR AFFECTIVE DISORDER, MIXED (HCC): Primary | ICD-10-CM

## 2021-10-19 DIAGNOSIS — F51.04 PSYCHOPHYSIOLOGICAL INSOMNIA: ICD-10-CM

## 2021-10-19 PROCEDURE — 90792 PSYCH DIAG EVAL W/MED SRVCS: CPT | Performed by: NURSE PRACTITIONER

## 2021-10-19 RX ORDER — TRAZODONE HYDROCHLORIDE 50 MG/1
50 TABLET ORAL NIGHTLY
Qty: 30 TABLET | Refills: 1 | Status: SHIPPED | OUTPATIENT
Start: 2021-10-19 | End: 2021-12-13

## 2021-10-19 NOTE — PROGRESS NOTES
Patient Name: Trisha Payne  MRN: [id number]   :  2000     Referring Physician: Provider, No Known    Chief Complaint:     ICD-10-CM ICD-9-CM   1. Bipolar affective disorder, mixed (HCC)  F31.60 296.60   2. Psychophysiological insomnia  F51.04 307.42       HPI:   Trisha Payne is a 21 y.o. female who is here today for initial evaluation of Bipolar Disorder and Insomnia .  Patient has a previous diagnosis of bipolar disorder.  Patient states she struggles with everything including school, work, family, and relationships.  Patient states she never feels she is good enough and has a very negative self image.  When she gets in depressive episodes she feels they are very.  States she is at top of her works very hard.  Lives in apartment with her boyfriend.  Is pushing him away.  They have not been intimate in several months.  States she feels her mind races at bedtime is a very difficult    Past Medical History:   Past Medical History:   Diagnosis Date   • Anemia    • Anxiety    • Bipolar disorder (HCC)    • Depression    • Ovarian cyst        Past Surgical History:   Past Surgical History:   Procedure Laterality Date   • EAR TUBES         Social History:   Social History     Socioeconomic History   • Marital status: Single   Tobacco Use   • Smoking status: Never Smoker   • Smokeless tobacco: Never Used   Vaping Use   • Vaping Use: Never used   Substance and Sexual Activity   • Alcohol use: No   • Drug use: No   • Sexual activity: Yes     Partners: Male     Birth control/protection: Condom       Family History:  Family History   Problem Relation Age of Onset   • Hypertension Paternal Grandfather    • Thyroid cancer Maternal Grandmother    • Mental illness Mother    • Migraines Mother    • Diabetes Father        Allergy:  Allergies   Allergen Reactions   • Bactrim [Sulfamethoxazole-Trimethoprim] Rash   • Doxycycline Rash   • Trileptal [Oxcarbazepine] Rash       Current Medications:   Current  Outpatient Medications   Medication Sig Dispense Refill   • levonorgestrel (Mirena, 52 MG,) 20 MCG/24HR IUD 1 each by Intrauterine route 1 (One) Time.     • Cariprazine HCl (Vraylar) 1.5 MG capsule capsule Take 1 capsule by mouth Daily. 30 capsule 2   • traZODone (DESYREL) 50 MG tablet Take 1 tablet by mouth Every Night. 30 tablet 1     No current facility-administered medications for this visit.       Lab Results:   Lab on 10/15/2021   Component Date Value Ref Range Status   • WBC 10/15/2021 5.80  3.40 - 10.80 10*3/mm3 Final   • RBC 10/15/2021 5.41* 3.77 - 5.28 10*6/mm3 Final   • Hemoglobin 10/15/2021 13.6  12.0 - 15.9 g/dL Final   • Hematocrit 10/15/2021 44.3  34.0 - 46.6 % Final   • MCV 10/15/2021 81.9  79.0 - 97.0 fL Final   • MCH 10/15/2021 25.1* 26.6 - 33.0 pg Final   • MCHC 10/15/2021 30.7* 31.5 - 35.7 g/dL Final   • RDW 10/15/2021 12.4  12.3 - 15.4 % Final   • RDW-SD 10/15/2021 36.7* 37.0 - 54.0 fl Final   • MPV 10/15/2021 12.1* 6.0 - 12.0 fL Final   • Platelets 10/15/2021 220  140 - 450 10*3/mm3 Final   • Neutrophil % 10/15/2021 93.1* 42.7 - 76.0 % Final   • Lymphocyte % 10/15/2021 5.3* 19.6 - 45.3 % Final   • Monocyte % 10/15/2021 1.0* 5.0 - 12.0 % Final   • Eosinophil % 10/15/2021 0.0* 0.3 - 6.2 % Final   • Basophil % 10/15/2021 0.3  0.0 - 1.5 % Final   • Immature Grans % 10/15/2021 0.3  0.0 - 0.5 % Final   • Neutrophils, Absolute 10/15/2021 5.39  1.70 - 7.00 10*3/mm3 Final   • Lymphocytes, Absolute 10/15/2021 0.31* 0.70 - 3.10 10*3/mm3 Final   • Monocytes, Absolute 10/15/2021 0.06* 0.10 - 0.90 10*3/mm3 Final   • Eosinophils, Absolute 10/15/2021 0.00  0.00 - 0.40 10*3/mm3 Final   • Basophils, Absolute 10/15/2021 0.02  0.00 - 0.20 10*3/mm3 Final   • Immature Grans, Absolute 10/15/2021 0.02  0.00 - 0.05 10*3/mm3 Final   • nRBC 10/15/2021 0.0  0.0 - 0.2 /100 WBC Final   • Glucose 10/15/2021 137* 65 - 99 mg/dL Final   • BUN 10/15/2021 7  6 - 20 mg/dL Final   • Creatinine 10/15/2021 0.79  0.57 - 1.00 mg/dL  Final   • Sodium 10/15/2021 142  136 - 145 mmol/L Final   • Potassium 10/15/2021 4.4  3.5 - 5.2 mmol/L Final   • Chloride 10/15/2021 105  98 - 107 mmol/L Final   • CO2 10/15/2021 23.7  22.0 - 29.0 mmol/L Final   • Calcium 10/15/2021 9.5  8.6 - 10.5 mg/dL Final   • Total Protein 10/15/2021 7.4  6.0 - 8.5 g/dL Final   • Albumin 10/15/2021 5.00  3.50 - 5.20 g/dL Final   • ALT (SGPT) 10/15/2021 15  1 - 33 U/L Final   • AST (SGOT) 10/15/2021 17  1 - 32 U/L Final   • Alkaline Phosphatase 10/15/2021 63  39 - 117 U/L Final   • Total Bilirubin 10/15/2021 1.2  0.0 - 1.2 mg/dL Final   • eGFR Non  Amer 10/15/2021 92  >60 mL/min/1.73 Final   • Globulin 10/15/2021 2.4  gm/dL Final   • A/G Ratio 10/15/2021 2.1  g/dL Final   • BUN/Creatinine Ratio 10/15/2021 8.9  7.0 - 25.0 Final   • Anion Gap 10/15/2021 13.3  5.0 - 15.0 mmol/L Final       Review of Symptoms:   Review of Systems   Constitutional: Negative for activity change, appetite change, fatigue, unexpected weight gain and unexpected weight loss.   Respiratory: Negative for shortness of breath and wheezing.    Gastrointestinal: Negative for constipation, diarrhea, nausea and vomiting.   Musculoskeletal: Negative for gait problem.   Skin: Negative for dry skin and rash.   Neurological: Negative for dizziness, speech difficulty, weakness, light-headedness, headache, memory problem and confusion.   Psychiatric/Behavioral: Positive for agitation, decreased concentration, sleep disturbance, depressed mood and stress. Negative for behavioral problems, dysphoric mood, hallucinations, self-injury, suicidal ideas and negative for hyperactivity. The patient is nervous/anxious.        Physical Exam:   Physical Exam  Vitals and nursing note reviewed.   Constitutional:       General: She is not in acute distress.     Appearance: She is well-developed. She is not diaphoretic.   HENT:      Head: Normocephalic and atraumatic.   Eyes:      Conjunctiva/sclera: Conjunctivae normal.  "  Cardiovascular:      Rate and Rhythm: Normal rate.   Pulmonary:      Effort: Pulmonary effort is normal. No respiratory distress.   Musculoskeletal:         General: Normal range of motion.      Cervical back: Full passive range of motion without pain and normal range of motion.   Skin:     General: Skin is warm and dry.   Neurological:      Mental Status: She is alert and oriented to person, place, and time.   Psychiatric:         Mood and Affect: Mood is anxious and depressed. Affect is tearful. Affect is not labile, blunt, angry or inappropriate.         Speech: Speech is not rapid and pressured or tangential.         Behavior: Behavior normal. Behavior is not agitated, slowed, aggressive, withdrawn, hyperactive or combative. Behavior is cooperative.         Thought Content: Thought content normal. Thought content is not paranoid or delusional. Thought content does not include homicidal or suicidal ideation. Thought content does not include homicidal or suicidal plan.         Judgment: Judgment normal.       Blood pressure 98/58, height 162.6 cm (64\"), weight 50.8 kg (112 lb), not currently breastfeeding.  Body mass index is 19.22 kg/m².     Mental Status Exam:   Appearance: appropriate  Hygiene:   good  Cooperation:  Cooperative  Eye Contact:  Good  Psychomotor Behavior:  Appropriate  Mood:anxious, depressed and sad  Affect:  Appropriate  Hopelessness: 4  Speech:  Normal  Thought Process:  Goal directed  Thought Content:  Normal  Suicidal:  None  Homicidal:  None  Hallucinations:  None  Delusion:  None  Memory:  Intact  Orientation:  Person, Place, Time and Situation  Reliability:  good  Insight:  Good  Judgement:  Good  Impulse Control:  Good  Physical/Medical Issues:  No     PHQ-9 Depression Screening  Little interest or pleasure in doing things? 3   Feeling down, depressed, or hopeless? 2   Trouble falling or staying asleep, or sleeping too much? 3 (Falling and staying asleep)   Feeling tired or having " little energy? 3   Poor appetite or overeating? 2 (Poor appetite)   Feeling bad about yourself - or that you are a failure or have let yourself or your family down? 3   Trouble concentrating on things, such as reading the newspaper or watching television? 2   Moving or speaking so slowly that other people could have noticed? Or the opposite - being so fidgety or restless that you have been moving around a lot more than usual? 0   Thoughts that you would be better off dead, or of hurting yourself in some way? 0   PHQ-9 Total Score 18   If you checked off any problems, how difficult have these problems made it for you to do your work, take care of things at home, or get along with other people? Extremely dIfficult      Assessment/Plan:   Diagnoses and all orders for this visit:    1. Bipolar affective disorder, mixed (HCC) (Primary)  -     Cariprazine HCl (Vraylar) 1.5 MG capsule capsule; Take 1 capsule by mouth Daily.  Dispense: 30 capsule; Refill: 2    2. Psychophysiological insomnia  -     traZODone (DESYREL) 50 MG tablet; Take 1 tablet by mouth Every Night.  Dispense: 30 tablet; Refill: 1    We will start Vraylar 1.5 mg daily.  We will also start trazodone 50 mg at bedtime for insomnia.  Will do video appointment next time as patient lives in [place] and is in school is very difficult to get here.    A psychological evaluation was conducted in order to assess past and current level of functioning. Areas assessed included, but were not limited to: perception of social support, perception of ability to face and deal with challenges in life (positive functioning), anxiety symptoms, depressive symptoms, perspective on beliefs/belief system, coping skills for stress, intelligence level,  Therapeutic rapport was established. Interventions conducted today were geared towards incorporating medication management along with support for continued therapy. Education was also provided as to the med management with this  provider and what to expect in subsequent sessions.    We discussed risks, benefits,goals and side effects of the above medication and the patient was agreeable with the plan.Patient was educated on the importance of compliance with treatment and follow-up appointments. Patient is aware to contact the [facility] with any worsening of symptoms. To call for questions or concerns and return early if necessary. Patent is agreeable to go to the Emergency Department or call 911 should they begin SI/HI.     Treatment Plan:   Discussed risks, benefits, and alternatives of medication. Encouraged healthy habits (eating, exercise and sleep). Call if any questions or problems arise. Medication reconciled. Controlled substance monitoring report reviewed. Provided psychoeducation.. Discussed coping strategies and current stressors. Set appropriate boundaries and limits for patient's well-being. Use distraction techniques to improve symptoms. Access support networks.      Return in about 4 weeks (around 11/16/2021) for Video visit.    Suma Kelly, APRN

## 2021-10-19 NOTE — TELEPHONE ENCOUNTER
Caller: Trisha Payne Fer    Relationship: Self    Best call back number: [phone]    What was the call regarding: PT CALLED TO ENSURE THAT HER [employer] MESSAGE WAS RECEIVED. INFORMED PT THAT HER [employer] MESSAGE WAS RECEIVED AND WE AWAITING A RESPONSE FROM ALEJANDRA IN HER REGARDS. PT VERBALIZED UNDERSTANDING. PT STATES SHE WAS KICKED OUT OF HER CLINICALS THIS MORNING AND RECEIVED A FAILING GRADE BECAUSE SHE DID NOT HAVE THE DOCUMENTATION NEEDED TO BE EXEMPT OF COVID19 VACCINATIONS. PT STATES SHE HAS ANOTHER ROUND OF CLINICALS COMING UP AND WILL NEED THIS DOCUMENTATION BEFORE THEN. PT STATES IF SHE RECEIVES ONE MORE FAILING GRADE, SHE WILL BE KICKED OUT OF HER PROGRAM.    PT STATES DOCUMENTATION CAN BE SENT TO HER VIA [employer].    Do you require a callback: YES, PLEASE.    PLEASE REVIEW AND ADVISE.

## 2021-10-21 ENCOUNTER — TELEPHONE (OUTPATIENT)
Dept: INTERNAL MEDICINE | Facility: CLINIC | Age: 21
End: 2021-10-21

## 2021-10-21 ENCOUNTER — TELEPHONE (OUTPATIENT)
Dept: NEUROLOGY | Facility: CLINIC | Age: 21
End: 2021-10-21

## 2021-10-21 NOTE — TELEPHONE ENCOUNTER
Please take the vraylar in the am to see if the restlessness gets better. I will check my messages Saturday.

## 2021-10-21 NOTE — TELEPHONE ENCOUNTER
----- Message from Torrey Gaspar, DESHAWN, APRN sent at 10/21/2021  1:14 PM EDT -----  Labs are stable, but please let us know if you are getting frequent infections. Glucose was elevated to 137.

## 2021-10-21 NOTE — TELEPHONE ENCOUNTER
Pt stated that she had started her vraylar and the trazidone last night, pt stated that she had been restless all night and all day today, pt stated very little sleep and that doron had said to call in if that happened

## 2021-12-13 ENCOUNTER — LAB (OUTPATIENT)
Dept: LAB | Facility: HOSPITAL | Age: 21
End: 2021-12-13

## 2021-12-13 ENCOUNTER — OFFICE VISIT (OUTPATIENT)
Dept: NEUROLOGY | Facility: CLINIC | Age: 21
End: 2021-12-13

## 2021-12-13 VITALS
DIASTOLIC BLOOD PRESSURE: 72 MMHG | BODY MASS INDEX: 19.94 KG/M2 | WEIGHT: 116.8 LBS | SYSTOLIC BLOOD PRESSURE: 110 MMHG | TEMPERATURE: 98.7 F | OXYGEN SATURATION: 98 % | HEIGHT: 64 IN | HEART RATE: 69 BPM

## 2021-12-13 DIAGNOSIS — F31.9 BIPOLAR 1 DISORDER (HCC): ICD-10-CM

## 2021-12-13 DIAGNOSIS — G35 MULTIPLE SCLEROSIS (HCC): Primary | ICD-10-CM

## 2021-12-13 DIAGNOSIS — M25.551 RIGHT HIP PAIN: ICD-10-CM

## 2021-12-13 DIAGNOSIS — R73.9 HYPERGLYCEMIA: ICD-10-CM

## 2021-12-13 DIAGNOSIS — M25.561 CHRONIC PAIN OF RIGHT KNEE: ICD-10-CM

## 2021-12-13 DIAGNOSIS — M79.2 NEUROPATHIC PAIN: ICD-10-CM

## 2021-12-13 DIAGNOSIS — G89.29 CHRONIC PAIN OF RIGHT KNEE: ICD-10-CM

## 2021-12-13 DIAGNOSIS — G35 MULTIPLE SCLEROSIS (HCC): ICD-10-CM

## 2021-12-13 DIAGNOSIS — N30.01 ACUTE CYSTITIS WITH HEMATURIA: ICD-10-CM

## 2021-12-13 LAB
ALBUMIN SERPL-MCNC: 4.9 G/DL (ref 3.5–5.2)
ALBUMIN/GLOB SERPL: 2 G/DL
ALP SERPL-CCNC: 71 U/L (ref 39–117)
ALT SERPL W P-5'-P-CCNC: 15 U/L (ref 1–33)
ANION GAP SERPL CALCULATED.3IONS-SCNC: 6.4 MMOL/L (ref 5–15)
AST SERPL-CCNC: 17 U/L (ref 1–32)
BILIRUB SERPL-MCNC: 1.3 MG/DL (ref 0–1.2)
BUN SERPL-MCNC: 10 MG/DL (ref 6–20)
BUN/CREAT SERPL: 13.2 (ref 7–25)
CALCIUM SPEC-SCNC: 9.8 MG/DL (ref 8.6–10.5)
CHLORIDE SERPL-SCNC: 103 MMOL/L (ref 98–107)
CO2 SERPL-SCNC: 30.6 MMOL/L (ref 22–29)
CREAT SERPL-MCNC: 0.76 MG/DL (ref 0.57–1)
GFR SERPL CREATININE-BSD FRML MDRD: 96 ML/MIN/1.73
GLOBULIN UR ELPH-MCNC: 2.4 GM/DL
GLUCOSE SERPL-MCNC: 97 MG/DL (ref 65–99)
HBA1C MFR BLD: 5.28 % (ref 4.8–5.6)
POTASSIUM SERPL-SCNC: 4.1 MMOL/L (ref 3.5–5.2)
PROT SERPL-MCNC: 7.3 G/DL (ref 6–8.5)
SODIUM SERPL-SCNC: 140 MMOL/L (ref 136–145)

## 2021-12-13 PROCEDURE — 36415 COLL VENOUS BLD VENIPUNCTURE: CPT

## 2021-12-13 PROCEDURE — 85025 COMPLETE CBC W/AUTO DIFF WBC: CPT

## 2021-12-13 PROCEDURE — 80053 COMPREHEN METABOLIC PANEL: CPT

## 2021-12-13 PROCEDURE — 83036 HEMOGLOBIN GLYCOSYLATED A1C: CPT

## 2021-12-13 PROCEDURE — 99214 OFFICE O/P EST MOD 30 MIN: CPT | Performed by: NURSE PRACTITIONER

## 2021-12-13 RX ORDER — PREGABALIN 50 MG/1
50 CAPSULE ORAL 2 TIMES DAILY
Qty: 60 CAPSULE | Refills: 2 | Status: SHIPPED | OUTPATIENT
Start: 2021-12-13 | End: 2023-02-22

## 2021-12-13 RX ORDER — MELOXICAM 7.5 MG/1
7.5 TABLET ORAL DAILY
Qty: 30 TABLET | Refills: 3 | Status: SHIPPED | OUTPATIENT
Start: 2021-12-13 | End: 2023-02-22

## 2021-12-13 NOTE — PROGRESS NOTES
Neuro Office Visit      Encounter Date: 2021   Patient Name: Trisha Payne  : 2000   MRN: [id number]     Chief Complaint:    Chief Complaint   Patient presents with   • Multiple Sclerosis     8 week follow up        History of Present Illness: Trisha Payne is a 21 y.o. female who is here today in Neurology for MS, Bipolar disorder.      Last visit 10/15/21 w me-Con Ocrevus, labs, refer to psych, cymbalta, xr hips and spine.  X-rays-not performed.  Labs elevated glucose 137, Abs lymph 0.31  Seen by MODESTO Ramirez-start Vraylar, Trazodone      MS  Ocrevus next due in April  Mid to low back pain is daily. Tx'd with IBU. Mild right hip pain. Standing offers relief. Bilat hand tremor, bilat blurred vision is intermittent. Has not eye exam.  No slurred speech, urinary urgency or constipation. No skin irritation. Has IUD.  Having some short term memory loss. Having skin tightness and decreased sensation in LE.  Has been on GBP in the past.    Had UTI developed Pyelo. Seen in ED treated with oral abx  x10 days    Problem History  2019 she presented to  ED with complaint of bilateral leg numbness and tingling   2019 she was seen by outpatient neurology where she described similar symptoms ongoing at that time for around 1 month with eventual a sending pattern up to above the umbilicus.    Evaluation by Dr Navarro NMO, SSA/SSB, HIV, RPR, ACE, ANCA neg  Recommended monitoring for second demyelinating lesion  2020 MRI thoracic spine - T7/8 cord lesion.    MRI Brain/cervical - 20 tiny T2 lesion R PVWM probable venous anomaly, no cord lesions    Sx resolved by 2020.  Episodes of N and weakness in LE lasting for a minute at time.  Flexing head or back provokes sx.     Diagnosed- MRI B/C/T 3/26/2021 as compared to  6/3/20 brain multiple new PVWM T2 lesions, new cervical cord lesions C4, C7, T7 cord lesion, no enhancement, small thoracic meningioma  MOG -   DMD  4/15/21 Ocrevus    MDD  Bipolar and depressed. Not suicidal. Not sleeping well. Vraylar caused vibrations and shaking. She stopped it after a week.  Seen by MODESTO Ramirez Needs to follow up.    Subjective      Past Medical History:   Past Medical History:   Diagnosis Date   • Anemia    • Anxiety    • Bipolar disorder (HCC)    • Depression    • Ovarian cyst        Past Surgical History:   Past Surgical History:   Procedure Laterality Date   • EAR TUBES         Family History:   Family History   Problem Relation Age of Onset   • Hypertension Paternal Grandfather    • Thyroid cancer Maternal Grandmother    • Mental illness Mother    • Migraines Mother    • Diabetes Father        Social History:   Social History     Socioeconomic History   • Marital status: Single   Tobacco Use   • Smoking status: Never Smoker   • Smokeless tobacco: Never Used   Vaping Use   • Vaping Use: Never used   Substance and Sexual Activity   • Alcohol use: No   • Drug use: No   • Sexual activity: Yes     Partners: Male     Birth control/protection: Condom       Medications:     Current Outpatient Medications:   •  levonorgestrel (Mirena, 52 MG,) 20 MCG/24HR IUD, 1 each by Intrauterine route 1 (One) Time., Disp: , Rfl:   •  meloxicam (Mobic) 7.5 MG tablet, Take 1 tablet by mouth Daily., Disp: 30 tablet, Rfl: 3  •  pregabalin (Lyrica) 50 MG capsule, Take 1 capsule by mouth 2 (Two) Times a Day., Disp: 60 capsule, Rfl: 2    Allergies:   Allergies   Allergen Reactions   • Bactrim [Sulfamethoxazole-Trimethoprim] Rash   • Doxycycline Rash   • Trileptal [Oxcarbazepine] Rash       PHQ-9 Total Score:     STEADI Fall Risk Assessment has not been completed.    Objective     Physical Exam:   Physical Exam  Eyes:      Pupils: Pupils are equal, round, and reactive to light.   Neurological:      Mental Status: She is oriented to person, place, and time.      Coordination: Finger-Nose-Finger Test, Heel to Shin Test and Romberg Test normal.      Gait:  Gait is intact.      Deep Tendon Reflexes:      Reflex Scores:       Tricep reflexes are 2+ on the right side and 2+ on the left side.       Bicep reflexes are 2+ on the right side and 2+ on the left side.       Brachioradialis reflexes are 2+ on the right side and 2+ on the left side.       Patellar reflexes are 2+ on the right side and 2+ on the left side.       Achilles reflexes are 2+ on the right side and 2+ on the left side.  Psychiatric:         Speech: Speech normal.         Neurologic Exam     Mental Status   Oriented to person, place, and time.   Follows 3 step commands.   Attention: normal. Concentration: normal.   Speech: speech is normal   Level of consciousness: alert  Knowledge: consistent with education.   Normal comprehension.     Cranial Nerves     CN III, IV, VI   Pupils are equal, round, and reactive to light.  Right pupil: Accommodation: intact.   Left pupil: Accommodation: intact.   CN III: no CN III palsy  CN VI: no CN VI palsy  Nystagmus: none   Diplopia: none  Upgaze: normal  Downgaze: normal  Conjugate gaze: present    CN VII   Facial expression full, symmetric.     CN VIII   Hearing: intact    CN XII   CN XII normal.     Motor Exam   Muscle bulk: normal  Overall muscle tone: normal    Strength   Right biceps: 5/5  Left biceps: 5/5  Right triceps: 5/5  Left triceps: 5/5  Right interossei: 5/5  Left interossei: 5/5  Right quadriceps: 5/5  Left quadriceps: 5/5  Right anterior tibial: 5/5  Left anterior tibial: 5/5  Right posterior tibial: 5/5  Left posterior tibial: 5/5    Sensory Exam   Light touch normal.     Gait, Coordination, and Reflexes     Gait  Gait: normal    Coordination   Romberg: negative  Finger to nose coordination: normal  Heel to shin coordination: normal    Tremor   Resting tremor: absent  Action tremor: right arm    Reflexes   Right brachioradialis: 2+  Left brachioradialis: 2+  Right biceps: 2+  Left biceps: 2+  Right triceps: 2+  Left triceps: 2+  Right patellar: 2+  Left  "patellar: 2+  Right achilles: 2+  Left achilles: 2+  Right : 2+  Left : 2+       Vital Signs:   Vitals:    12/13/21 1305   BP: 110/72   Pulse: 69   Temp: 98.7 °F (37.1 °C)   SpO2: 98%   Weight: 53 kg (116 lb 12.8 oz)   Height: 162.6 cm (64\")     Body mass index is 20.05 kg/m².         Assessment / Plan      Assessment/Plan:   Diagnoses and all orders for this visit:    1. Multiple sclerosis (HCC) (Primary)  -     CBC Auto Differential; Future  -     Comprehensive Metabolic Panel; Future    2. Neuropathic pain  -     pregabalin (Lyrica) 50 MG capsule; Take 1 capsule by mouth 2 (Two) Times a Day.  Dispense: 60 capsule; Refill: 2    3. Right hip pain  -     meloxicam (Mobic) 7.5 MG tablet; Take 1 tablet by mouth Daily.  Dispense: 30 tablet; Refill: 3    4. Chronic pain of right knee  -     meloxicam (Mobic) 7.5 MG tablet; Take 1 tablet by mouth Daily.  Dispense: 30 tablet; Refill: 3    5. Hyperglycemia  -     Hemoglobin A1c; Future           Patient Education:     Reviewed medications, potential side effects and signs and symptoms to report. Discussed risk versus benefits of treatment plan with patient and/or family-including medications, labs and radiology that may be ordered. Addressed questions and concerns during visit. Patient and/or family verbalized understanding and agree with plan. Instructed to call the office with any questions and report to ER with any life-threatening symptoms.     Follow Up:   Return in about 3 months (around 3/13/2022) for Recheck.    During this visit the following were done:  Labs Reviewed [x]    Labs Ordered [x]    Radiology Reports Reviewed [x]    Radiology Ordered []    PCP Records Reviewed []    Referring Provider Records Reviewed []    ER Records Reviewed []    Hospital Records Reviewed []    History Obtained From Family []    Radiology Images Reviewed []    Other Reviewed [x]    Records Requested []      Torrey Gaspar, DESHAWN, APRN  "

## 2021-12-14 ENCOUNTER — TELEPHONE (OUTPATIENT)
Dept: NEUROLOGY | Facility: CLINIC | Age: 21
End: 2021-12-14

## 2021-12-14 LAB
BASOPHILS # BLD AUTO: 0.03 10*3/MM3 (ref 0–0.2)
BASOPHILS NFR BLD AUTO: 0.5 % (ref 0–1.5)
DEPRECATED RDW RBC AUTO: 38.2 FL (ref 37–54)
EOSINOPHIL # BLD AUTO: 0.07 10*3/MM3 (ref 0–0.4)
EOSINOPHIL NFR BLD AUTO: 1.1 % (ref 0.3–6.2)
ERYTHROCYTE [DISTWIDTH] IN BLOOD BY AUTOMATED COUNT: 12.8 % (ref 12.3–15.4)
HCT VFR BLD AUTO: 44.8 % (ref 34–46.6)
HGB BLD-MCNC: 13.8 G/DL (ref 12–15.9)
IMM GRANULOCYTES # BLD AUTO: 0.01 10*3/MM3 (ref 0–0.05)
IMM GRANULOCYTES NFR BLD AUTO: 0.2 % (ref 0–0.5)
LYMPHOCYTES # BLD AUTO: 1.5 10*3/MM3 (ref 0.7–3.1)
LYMPHOCYTES NFR BLD AUTO: 22.9 % (ref 19.6–45.3)
MCH RBC QN AUTO: 25.4 PG (ref 26.6–33)
MCHC RBC AUTO-ENTMCNC: 30.8 G/DL (ref 31.5–35.7)
MCV RBC AUTO: 82.5 FL (ref 79–97)
MONOCYTES # BLD AUTO: 0.8 10*3/MM3 (ref 0.1–0.9)
MONOCYTES NFR BLD AUTO: 12.2 % (ref 5–12)
NEUTROPHILS NFR BLD AUTO: 4.14 10*3/MM3 (ref 1.7–7)
NEUTROPHILS NFR BLD AUTO: 63.1 % (ref 42.7–76)
NRBC BLD AUTO-RTO: 0 /100 WBC (ref 0–0.2)
PLATELET # BLD AUTO: 243 10*3/MM3 (ref 140–450)
PMV BLD AUTO: 12.3 FL (ref 6–12)
RBC # BLD AUTO: 5.43 10*6/MM3 (ref 3.77–5.28)
WBC NRBC COR # BLD: 6.55 10*3/MM3 (ref 3.4–10.8)

## 2021-12-14 NOTE — TELEPHONE ENCOUNTER
----- Message from Torrey Gaspar DNP, APRN sent at 12/14/2021  2:46 PM EST -----  Please let her know her labs are stable.

## 2022-01-28 ENCOUNTER — TELEPHONE (OUTPATIENT)
Dept: NEUROLOGY | Facility: CLINIC | Age: 22
End: 2022-01-28

## 2022-01-28 DIAGNOSIS — M54.50 LOW BACK PAIN WITHOUT SCIATICA, UNSPECIFIED BACK PAIN LATERALITY, UNSPECIFIED CHRONICITY: Primary | ICD-10-CM

## 2022-01-28 DIAGNOSIS — G35 MULTIPLE SCLEROSIS: ICD-10-CM

## 2022-01-28 NOTE — TELEPHONE ENCOUNTER
Provider: MODESTO FOX  Caller: JIL GIFFORD  Relationship to Patient: SELF        Reason for Call:PT CALLING TO GIVE THE FAX NUMBER TO [facility]. PLEASE FAX THE ORDER [phone].      PLEASE IF YOU HAVE ANY QUESTIONS CALL HER BACK -[phone]

## 2022-04-08 ENCOUNTER — TELEPHONE (OUTPATIENT)
Dept: NEUROLOGY | Facility: CLINIC | Age: 22
End: 2022-04-08

## 2022-04-08 DIAGNOSIS — G35 MULTIPLE SCLEROSIS: Primary | ICD-10-CM

## 2022-04-08 NOTE — TELEPHONE ENCOUNTER
Provider: LILI PETERSEN    Caller: JIL    Relationship to Patient: SELF    Phone Number: [phone]    Reason for Call: PT WAS CALLING TO SEE IF SHE HAD AN APPT SCHEDULED AFTER HER INFUSION NEXT WEEK.  NO APPT SCHEDULED.  PT STATES THAT IT IS TIME FOR HER TO HAVE HER SCANS DONE, CT OF L SPINE AND BRAIN W/WO CONTRAST.  PT IS ASKING IF THESE CAN BE ORDERED SO THAT SHE CAN SCHED A F/U.  PT DOES NOT WANT TO MAKE A F/U, THEN HAVE TESTS, THEN HAVE ANOTHER F/U.

## 2022-04-08 NOTE — TELEPHONE ENCOUNTER
Called patient and relayed message and patient was understanding.  Patient asked if it was with or without contrast and it was with or without contrast per provider.    Per provider.    I placed orders for MRI brain, cervical and thoracic spine. There are orders in epic for x-rays of hips and lumbar spine from Oct. She can have those done at the same time. Schedule appt after MRI's.

## 2022-04-08 NOTE — TELEPHONE ENCOUNTER
I have placed orders for MRI brain, c-spine and T-spine. She needs to schedule appt for after MRI are complete. There are orders in epic for x-ray of lumbar spine and hips from October. She can have those done at the same time.

## 2022-04-15 ENCOUNTER — INFUSION (OUTPATIENT)
Dept: ONCOLOGY | Facility: HOSPITAL | Age: 22
End: 2022-04-15

## 2022-04-15 VITALS
SYSTOLIC BLOOD PRESSURE: 112 MMHG | DIASTOLIC BLOOD PRESSURE: 69 MMHG | TEMPERATURE: 99 F | RESPIRATION RATE: 18 BRPM | HEART RATE: 93 BPM

## 2022-04-15 DIAGNOSIS — G35 MULTIPLE SCLEROSIS: Primary | ICD-10-CM

## 2022-04-15 PROCEDURE — 96366 THER/PROPH/DIAG IV INF ADDON: CPT

## 2022-04-15 PROCEDURE — 96415 CHEMO IV INFUSION ADDL HR: CPT

## 2022-04-15 PROCEDURE — 96376 TX/PRO/DX INJ SAME DRUG ADON: CPT

## 2022-04-15 PROCEDURE — 96413 CHEMO IV INFUSION 1 HR: CPT

## 2022-04-15 PROCEDURE — 96365 THER/PROPH/DIAG IV INF INIT: CPT

## 2022-04-15 PROCEDURE — 25010000002 METHYLPREDNISOLONE PER 125 MG: Performed by: NURSE PRACTITIONER

## 2022-04-15 PROCEDURE — 96375 TX/PRO/DX INJ NEW DRUG ADDON: CPT

## 2022-04-15 PROCEDURE — 25010000002 OCRELIZUMAB 300 MG/10ML SOLUTION 300 MG VIAL: Performed by: NURSE PRACTITIONER

## 2022-04-15 PROCEDURE — 25010000002 DIPHENHYDRAMINE PER 50 MG: Performed by: NURSE PRACTITIONER

## 2022-04-15 RX ORDER — ACETAMINOPHEN 325 MG/1
650 TABLET ORAL ONCE
Status: CANCELLED | OUTPATIENT
Start: 2022-10-14

## 2022-04-15 RX ORDER — SODIUM CHLORIDE 9 MG/ML
250 INJECTION, SOLUTION INTRAVENOUS ONCE
Status: COMPLETED | OUTPATIENT
Start: 2022-04-15 | End: 2022-04-15

## 2022-04-15 RX ORDER — DIPHENHYDRAMINE HYDROCHLORIDE 50 MG/ML
25 INJECTION INTRAMUSCULAR; INTRAVENOUS ONCE
Status: COMPLETED | OUTPATIENT
Start: 2022-04-15 | End: 2022-04-15

## 2022-04-15 RX ORDER — FAMOTIDINE 10 MG/ML
20 INJECTION, SOLUTION INTRAVENOUS AS NEEDED
Status: CANCELLED | OUTPATIENT
Start: 2022-10-14

## 2022-04-15 RX ORDER — ACETAMINOPHEN 325 MG/1
650 TABLET ORAL ONCE
Status: COMPLETED | OUTPATIENT
Start: 2022-04-15 | End: 2022-04-15

## 2022-04-15 RX ORDER — METHYLPREDNISOLONE SODIUM SUCCINATE 125 MG/2ML
100 INJECTION, POWDER, LYOPHILIZED, FOR SOLUTION INTRAMUSCULAR; INTRAVENOUS ONCE
Status: CANCELLED | OUTPATIENT
Start: 2022-10-14

## 2022-04-15 RX ORDER — ACETAMINOPHEN 325 MG/1
650 TABLET ORAL EVERY 6 HOURS PRN
Status: CANCELLED | OUTPATIENT
Start: 2022-10-14

## 2022-04-15 RX ORDER — SODIUM CHLORIDE 9 MG/ML
250 INJECTION, SOLUTION INTRAVENOUS ONCE
Status: CANCELLED | OUTPATIENT
Start: 2022-10-14

## 2022-04-15 RX ORDER — FAMOTIDINE 10 MG/ML
20 INJECTION, SOLUTION INTRAVENOUS AS NEEDED
Status: DISCONTINUED | OUTPATIENT
Start: 2022-04-15 | End: 2022-04-15 | Stop reason: HOSPADM

## 2022-04-15 RX ORDER — DIPHENHYDRAMINE HYDROCHLORIDE 50 MG/ML
25 INJECTION INTRAMUSCULAR; INTRAVENOUS ONCE
Status: CANCELLED | OUTPATIENT
Start: 2022-10-14

## 2022-04-15 RX ORDER — METHYLPREDNISOLONE SODIUM SUCCINATE 125 MG/2ML
100 INJECTION, POWDER, LYOPHILIZED, FOR SOLUTION INTRAMUSCULAR; INTRAVENOUS ONCE
Status: COMPLETED | OUTPATIENT
Start: 2022-04-15 | End: 2022-04-15

## 2022-04-15 RX ORDER — DIPHENHYDRAMINE HYDROCHLORIDE 50 MG/ML
50 INJECTION INTRAMUSCULAR; INTRAVENOUS AS NEEDED
Status: CANCELLED | OUTPATIENT
Start: 2022-10-14

## 2022-04-15 RX ORDER — IBUPROFEN 400 MG/1
400 TABLET ORAL EVERY 6 HOURS PRN
Status: CANCELLED | OUTPATIENT
Start: 2022-10-14

## 2022-04-15 RX ADMIN — DIPHENHYDRAMINE HYDROCHLORIDE: 50 INJECTION, SOLUTION INTRAMUSCULAR; INTRAVENOUS at 10:08

## 2022-04-15 RX ADMIN — OCRELIZUMAB 600 MG: 300 INJECTION INTRAVENOUS at 08:48

## 2022-04-15 RX ADMIN — SODIUM CHLORIDE 250 ML: 9 INJECTION, SOLUTION INTRAVENOUS at 08:09

## 2022-04-15 RX ADMIN — FAMOTIDINE 20 MG: 10 INJECTION INTRAVENOUS at 10:04

## 2022-04-15 RX ADMIN — ACETAMINOPHEN 650 MG: 325 TABLET ORAL at 08:09

## 2022-04-15 RX ADMIN — METHYLPREDNISOLONE SODIUM SUCCINATE 100 MG: 125 INJECTION, POWDER, FOR SOLUTION INTRAMUSCULAR; INTRAVENOUS at 08:09

## 2022-04-15 RX ADMIN — DIPHENHYDRAMINE HYDROCHLORIDE 25 MG: 50 INJECTION, SOLUTION INTRAMUSCULAR; INTRAVENOUS at 08:09

## 2022-04-15 NOTE — PROGRESS NOTES
9:59  Patient c/o throat tight and scratchy.  Ocrevus infusion stopped and pepcid and benadryl given, see MAR.  Vital signs stable and at 10:59, Ocrevus infusion restarted at half the rate. Infusion complete without further reactions.   She will follow up with her next infusion.

## 2022-05-05 ENCOUNTER — TELEPHONE (OUTPATIENT)
Dept: NEUROLOGY | Facility: CLINIC | Age: 22
End: 2022-05-05

## 2022-05-05 DIAGNOSIS — F40.240 CLAUSTROPHOBIA: Primary | ICD-10-CM

## 2022-05-05 RX ORDER — DIAZEPAM 5 MG/1
TABLET ORAL
Qty: 2 TABLET | Refills: 0 | Status: SHIPPED | OUTPATIENT
Start: 2022-05-05 | End: 2023-02-22

## 2022-05-05 NOTE — TELEPHONE ENCOUNTER
Provider: LILI PETERSEN   Caller: JIL   Relationship to Patient: PT   Pharmacy: [facility]   Phone Number: [phone]  Reason for Call: PT ASKING FOR SOMETHING TO TAKE BEFORE HER MRI'S ON 5/17/2022. PLEASE ADVISE PT.

## 2022-05-17 ENCOUNTER — HOSPITAL ENCOUNTER (OUTPATIENT)
Dept: MRI IMAGING | Facility: HOSPITAL | Age: 22
Discharge: HOME | End: 2022-05-17

## 2022-05-17 DIAGNOSIS — G35 MULTIPLE SCLEROSIS: ICD-10-CM

## 2022-05-17 PROCEDURE — 72156 MRI NECK SPINE W/O & W/DYE: CPT

## 2022-05-17 PROCEDURE — 70553 MRI BRAIN STEM W/O & W/DYE: CPT

## 2022-05-17 PROCEDURE — 0 GADOBENATE DIMEGLUMINE 529 MG/ML SOLUTION: Performed by: NURSE PRACTITIONER

## 2022-05-17 PROCEDURE — 72157 MRI CHEST SPINE W/O & W/DYE: CPT

## 2022-05-17 PROCEDURE — A9577 INJ MULTIHANCE: HCPCS | Performed by: NURSE PRACTITIONER

## 2022-05-17 RX ADMIN — GADOBENATE DIMEGLUMINE 10 ML: 529 INJECTION, SOLUTION INTRAVENOUS at 16:36

## 2022-05-18 ENCOUNTER — TELEPHONE (OUTPATIENT)
Dept: NEUROLOGY | Facility: CLINIC | Age: 22
End: 2022-05-18

## 2022-05-18 NOTE — TELEPHONE ENCOUNTER
Called patient and gave results.  Patient was understanding and appreciative.      ----- Message from Torrey Gaspar DNP, APRN sent at 5/18/2022  2:49 PM EDT -----  Please notify pt that mri of brain is stable w no new lesions.

## 2022-05-18 NOTE — TELEPHONE ENCOUNTER
Called patient and gave results.  Patient was understanding and appreciative.     ----- Message from Torrey Gaspar DNP, MODESTO sent at 5/18/2022  2:49 PM EDT -----  Please notify pt mri of thoracic spine is stable w no new or enlarging lesions.

## 2022-05-18 NOTE — TELEPHONE ENCOUNTER
Called patient and gave results.  Patient was understanding and appreciative.    ----- Message from Torrey Gaspar DNP, APRN sent at 5/18/2022  2:48 PM EDT -----  Please notify pt that some of the cervical lesions are improving. There are no new or enlarging lesions

## 2022-10-05 ENCOUNTER — TELEPHONE (OUTPATIENT)
Dept: NEUROLOGY | Facility: CLINIC | Age: 22
End: 2022-10-05

## 2022-10-05 NOTE — TELEPHONE ENCOUNTER
It is safe to take. However, if she is having frequent infections I would like for her schedule an appt with me or Dr Ty.

## 2022-10-05 NOTE — TELEPHONE ENCOUNTER
Provider: LILI PETERSEN APRN    Caller: JIL    Relationship to Patient: SELF    Phone Number: [phone]      Reason for Call: STATED WAS PRESCRIBED TODAY BY HER DERMATOLOGIST,  BACTERIUM  -160 MG.  STATED IT IS FOR 2 X A DAY FOR 2 MONTHS.  PT WANTS TO KNOW IF THIS IS ALRIGHT TO TAKE BEFORE HER INFUSION  ON 10-14-22.  PT WANTS TO KNOW IF IT WILL BE SAFE TO TAKE.      PLEASE CALL & ADVISE

## 2022-10-06 NOTE — TELEPHONE ENCOUNTER
Called patient and gave provider's note.  Patient was understanding.    Tried to call but could not leave vm was full.

## 2022-10-13 DIAGNOSIS — G35 MULTIPLE SCLEROSIS: Primary | ICD-10-CM

## 2022-10-13 RX ORDER — IBUPROFEN 400 MG/1
400 TABLET ORAL EVERY 6 HOURS PRN
Status: CANCELLED | OUTPATIENT
Start: 2022-10-13

## 2022-10-13 RX ORDER — SODIUM CHLORIDE 9 MG/ML
250 INJECTION, SOLUTION INTRAVENOUS ONCE
Status: CANCELLED | OUTPATIENT
Start: 2022-10-13

## 2022-10-13 RX ORDER — DIPHENHYDRAMINE HYDROCHLORIDE 50 MG/ML
50 INJECTION INTRAMUSCULAR; INTRAVENOUS AS NEEDED
Status: CANCELLED | OUTPATIENT
Start: 2022-10-13

## 2022-10-13 RX ORDER — DIPHENHYDRAMINE HYDROCHLORIDE 50 MG/ML
25 INJECTION INTRAMUSCULAR; INTRAVENOUS ONCE
Status: CANCELLED | OUTPATIENT
Start: 2022-10-13

## 2022-10-13 RX ORDER — ACETAMINOPHEN 325 MG/1
650 TABLET ORAL EVERY 6 HOURS PRN
Status: CANCELLED | OUTPATIENT
Start: 2022-10-13

## 2022-10-13 RX ORDER — FAMOTIDINE 10 MG/ML
20 INJECTION, SOLUTION INTRAVENOUS AS NEEDED
Status: CANCELLED | OUTPATIENT
Start: 2022-10-13

## 2022-10-13 RX ORDER — ACETAMINOPHEN 325 MG/1
650 TABLET ORAL ONCE
Status: CANCELLED | OUTPATIENT
Start: 2022-10-13

## 2022-10-14 ENCOUNTER — INFUSION (OUTPATIENT)
Dept: ONCOLOGY | Facility: HOSPITAL | Age: 22
End: 2022-10-14

## 2022-10-14 VITALS
RESPIRATION RATE: 18 BRPM | TEMPERATURE: 98.4 F | DIASTOLIC BLOOD PRESSURE: 67 MMHG | SYSTOLIC BLOOD PRESSURE: 114 MMHG | HEART RATE: 81 BPM

## 2022-10-14 DIAGNOSIS — G35 MULTIPLE SCLEROSIS: Primary | ICD-10-CM

## 2022-10-14 PROCEDURE — 25010000002 METHYLPREDNISOLONE PER 125 MG: Performed by: NURSE PRACTITIONER

## 2022-10-14 PROCEDURE — 96368 THER/DIAG CONCURRENT INF: CPT

## 2022-10-14 PROCEDURE — 96413 CHEMO IV INFUSION 1 HR: CPT

## 2022-10-14 PROCEDURE — 96365 THER/PROPH/DIAG IV INF INIT: CPT

## 2022-10-14 PROCEDURE — 96375 TX/PRO/DX INJ NEW DRUG ADDON: CPT

## 2022-10-14 PROCEDURE — 25010000002 DIPHENHYDRAMINE PER 50 MG: Performed by: NURSE PRACTITIONER

## 2022-10-14 PROCEDURE — 25010000002 OCRELIZUMAB 300 MG/10ML SOLUTION 300 MG VIAL: Performed by: NURSE PRACTITIONER

## 2022-10-14 PROCEDURE — 96415 CHEMO IV INFUSION ADDL HR: CPT

## 2022-10-14 PROCEDURE — 96366 THER/PROPH/DIAG IV INF ADDON: CPT

## 2022-10-14 RX ORDER — METHYLPREDNISOLONE SODIUM SUCCINATE 125 MG/2ML
100 INJECTION, POWDER, LYOPHILIZED, FOR SOLUTION INTRAMUSCULAR; INTRAVENOUS ONCE
OUTPATIENT
Start: 2023-04-14

## 2022-10-14 RX ORDER — SODIUM CHLORIDE 9 MG/ML
250 INJECTION, SOLUTION INTRAVENOUS ONCE
OUTPATIENT
Start: 2023-04-14

## 2022-10-14 RX ORDER — FAMOTIDINE 10 MG/ML
20 INJECTION, SOLUTION INTRAVENOUS AS NEEDED
Status: DISCONTINUED | OUTPATIENT
Start: 2022-10-14 | End: 2022-10-14 | Stop reason: HOSPADM

## 2022-10-14 RX ORDER — IBUPROFEN 400 MG/1
400 TABLET ORAL EVERY 6 HOURS PRN
OUTPATIENT
Start: 2023-04-14

## 2022-10-14 RX ORDER — DIPHENHYDRAMINE HYDROCHLORIDE 50 MG/ML
50 INJECTION INTRAMUSCULAR; INTRAVENOUS AS NEEDED
OUTPATIENT
Start: 2023-04-14

## 2022-10-14 RX ORDER — METHYLPREDNISOLONE SODIUM SUCCINATE 125 MG/2ML
100 INJECTION, POWDER, LYOPHILIZED, FOR SOLUTION INTRAMUSCULAR; INTRAVENOUS ONCE
Status: COMPLETED | OUTPATIENT
Start: 2022-10-14 | End: 2022-10-14

## 2022-10-14 RX ORDER — FAMOTIDINE 10 MG/ML
20 INJECTION, SOLUTION INTRAVENOUS AS NEEDED
OUTPATIENT
Start: 2023-04-14

## 2022-10-14 RX ORDER — ACETAMINOPHEN 325 MG/1
650 TABLET ORAL EVERY 6 HOURS PRN
OUTPATIENT
Start: 2023-04-14

## 2022-10-14 RX ORDER — ACETAMINOPHEN 325 MG/1
650 TABLET ORAL ONCE
Status: COMPLETED | OUTPATIENT
Start: 2022-10-14 | End: 2022-10-14

## 2022-10-14 RX ORDER — SODIUM CHLORIDE 9 MG/ML
250 INJECTION, SOLUTION INTRAVENOUS ONCE
Status: DISCONTINUED | OUTPATIENT
Start: 2022-10-14 | End: 2022-10-14 | Stop reason: HOSPADM

## 2022-10-14 RX ORDER — ACETAMINOPHEN 325 MG/1
650 TABLET ORAL ONCE
OUTPATIENT
Start: 2023-04-14

## 2022-10-14 RX ORDER — DIPHENHYDRAMINE HYDROCHLORIDE 50 MG/ML
25 INJECTION INTRAMUSCULAR; INTRAVENOUS ONCE
OUTPATIENT
Start: 2023-04-14

## 2022-10-14 RX ORDER — DIPHENHYDRAMINE HYDROCHLORIDE 50 MG/ML
25 INJECTION INTRAMUSCULAR; INTRAVENOUS ONCE
Status: COMPLETED | OUTPATIENT
Start: 2022-10-14 | End: 2022-10-14

## 2022-10-14 RX ADMIN — DIPHENHYDRAMINE HYDROCHLORIDE 25 MG: 50 INJECTION INTRAMUSCULAR; INTRAVENOUS at 08:37

## 2022-10-14 RX ADMIN — ACETAMINOPHEN 650 MG: 325 TABLET ORAL at 08:36

## 2022-10-14 RX ADMIN — OCRELIZUMAB 600 MG: 300 INJECTION INTRAVENOUS at 09:10

## 2022-10-14 RX ADMIN — DIPHENHYDRAMINE HYDROCHLORIDE: 50 INJECTION INTRAMUSCULAR; INTRAVENOUS at 09:42

## 2022-10-14 RX ADMIN — METHYLPREDNISOLONE SODIUM SUCCINATE 100 MG: 125 INJECTION, POWDER, FOR SOLUTION INTRAMUSCULAR; INTRAVENOUS at 08:36

## 2022-10-14 RX ADMIN — FAMOTIDINE 20 MG: 10 INJECTION INTRAVENOUS at 09:40

## 2022-10-14 NOTE — PROGRESS NOTES
6853. Pt said her throat was scratchy.  Stopped infusion. Initiated emergency meds. Restarted. Pt completed

## 2023-02-17 ENCOUNTER — TELEPHONE (OUTPATIENT)
Dept: NEUROLOGY | Facility: CLINIC | Age: 23
End: 2023-02-17
Payer: COMMERCIAL

## 2023-02-17 NOTE — TELEPHONE ENCOUNTER
Caller: JIL    Best call back number: [phone]  IF YOU CAN CALL HER AROUND 1PM TODAY THAT WOULD BE GREAT     What was the call regarding: PT HAS QUESTIONS ABOUT HER INFUSION IN April  AND HAVING SURG IN MAY   SHE WANTS TO TALK TO FAUSTINO PERSONALLY     Do you require a callback: YES     PLEASE ADVISE.

## 2023-02-17 NOTE — TELEPHONE ENCOUNTER
Provider: LILI PETERSEN  Caller: PATIENT  Relationship to Patient: SELF  Phone Number: [phone]  Reason for Call: PATIENT  CALLED BACK REGARDING PREVIOUS MSG SHE LEFT THIS MORNING.  PATIENT STATED SHE WOULD RATHER SPEAK TO SOMEONE  BY PHONE, BUT CAN SEND A MSG THROUGH  [employer].     PLEASE REVIEW AND ADVISE    THANK YOU

## 2023-02-17 NOTE — TELEPHONE ENCOUNTER
Spoke with patient. She is having surgery in May and wants to make sure she is good to infuse Ocrevus in April. Pt is concerned that a month between infusion could increase her risk of infection. We discussed pushing out infusion until June if needed but I will discuss with Jody

## 2023-02-18 NOTE — TELEPHONE ENCOUNTER
I called Trisha back today. Unable to leave message since voice mailbox has not been set up. Pt has appt w JACK this week.

## 2023-02-21 NOTE — PROGRESS NOTES
Neuro Office Visit      Encounter Date: 2023   Patient Name: Trisha Payne  : 2000   MRN: [id number]   PCP:  Provider, No Known     Chief Complaint:    Chief Complaint   Patient presents with   • Multiple Sclerosis       History of Present Illness: Trisha Payne is a 22 y.o. female who is here today in Neurology for follow up of MS.       Last visit with MODESTO Paniagua on 2021.    Ocrevus infusion 10/14/2022.    Continues to have a foggy brain and chronic back pain but no other symptoms.    She has a breast augmentation surgery scheduled for May 17 and questions timing of Ocrevus infusion. Wondering if she should delay the infusion.    Problem history:  New onset of B LE numbness starting in 2019.  Started in toes and ascended up LE.  L > R sx.  Sensation of walking on sand.  Evaluated at  ED and noted to have decreased sensation from waist down.       MRI B/C/T 6/3/20 brain one left PVWM T2 lesion, no cervical cord lesions, T7 cord lesion, no enhancement, small thoracic meningioma      Las: MOG, NMO, AMELIA, Sed rate, RF - negative.      MOG - neg    Subjective        Past Medical History:   Past Medical History:   Diagnosis Date   • Anemia    • Anxiety    • Bipolar disorder (HCC)    • Depression    • Ovarian cyst        Past Surgical History:   Past Surgical History:   Procedure Laterality Date   • EAR TUBES         Family History:   Family History   Problem Relation Age of Onset   • Hypertension Paternal Grandfather    • Thyroid cancer Maternal Grandmother    • Mental illness Mother    • Migraines Mother    • Diabetes Father        Social History:   Social History     Socioeconomic History   • Marital status: Single   Tobacco Use   • Smoking status: Never   • Smokeless tobacco: Never   Vaping Use   • Vaping Use: Never used   Substance and Sexual Activity   • Alcohol use: No   • Drug use: No   • Sexual activity: Yes     Partners: Male     Birth control/protection:  Condom       Medications:     Current Outpatient Medications:   •  levonorgestrel (Mirena, 52 MG,) 20 MCG/24HR IUD, 1 each by Intrauterine route 1 (One) Time., Disp: , Rfl:     Allergies:   Allergies   Allergen Reactions   • Bactrim [Sulfamethoxazole-Trimethoprim] Rash   • Doxycycline Rash   • Trileptal [Oxcarbazepine] Rash       PHQ-9 Total Score:     STEADI Fall Risk Assessment has not been completed.    Objective     Physical Exam:   Physical Exam  Vitals reviewed.   Eyes:      Extraocular Movements: EOM normal.      Pupils: Pupils are equal, round, and reactive to light.   Neurological:      Mental Status: She is oriented to person, place, and time.      Gait: Gait is intact.   Psychiatric:         Speech: Speech normal.         Neurologic Exam     Mental Status   Oriented to person, place, and time.   Attention: normal. Concentration: normal.   Speech: speech is normal   Level of consciousness: alert  Knowledge: good.     Cranial Nerves     CN II   Visual fields full to confrontation.     CN III, IV, VI   Pupils are equal, round, and reactive to light.  Extraocular motions are normal.   CN III: no CN III palsy  CN VI: no CN VI palsy    CN V   Facial sensation intact.     CN VII   Facial expression full, symmetric.     CN VIII   CN VIII normal.     CN IX, X   CN IX normal.   CN X normal.     CN XI   CN XI normal.     CN XII   CN XII normal.     Motor Exam     Strength   Right neck flexion: 5/5  Left neck flexion: 5/5  Right neck extension: 5/5  Left neck extension: 5/5  Right deltoid: 5/5  Left deltoid: 5/5  Right biceps: 5/5  Left biceps: 5/5  Right triceps: 5/5  Left triceps: 5/5  Right wrist flexion: 5/5  Left wrist flexion: 5/5  Right wrist extension: 5/5  Left wrist extension: 5/5  Right interossei: 5/5  Left interossei: 5/5  Right abdominals: 5/5  Left abdominals: 5/5  Right iliopsoas: 5/5  Left iliopsoas: 5/5  Right quadriceps: 5/5  Left quadriceps: 5/5  Right hamstrin/5  Left hamstrin/5  Right  "glutei: 5/5  Left glutei: 5/5  Right anterior tibial: 5/5  Left anterior tibial: 5/5  Right posterior tibial: 5/5  Left posterior tibial: 5/5  Right peroneal: 5/5  Left peroneal: 5/5  Right gastroc: 5/5  Left gastroc: 5/5    Sensory Exam   Light touch normal.     Gait, Coordination, and Reflexes     Gait  Gait: normal       Vital Signs:   Vitals:    02/22/23 1414   BP: 104/66   Pulse: 88   Temp: 97.2 °F (36.2 °C)   SpO2: 99%   Weight: 51.7 kg (114 lb)   Height: 162.6 cm (64\")     Body mass index is 19.57 kg/m².     Results:   Imaging:   No Images in the past 120 days found..     Labs:    No results found for: CMP, PROTEIN, ANTIMOGAB, [id number], JCVRESULT, QUANTTBGOLD, CBCDIF, IGGALBSER     Assessment / Plan      Assessment/Plan:   Diagnoses and all orders for this visit:    1. Multiple sclerosis (HCC) (Primary)           Patient Education:   Advised to delay infusion until she is healed from breast augmentation surgery and released by the surgeon.    Reviewed medications, potential side effects and signs and symptoms to report. Discussed risk versus benefits of treatment plan with patient and/or family-including medications, labs and radiology that may be ordered. Addressed questions and concerns during visit. Patient and/or family verbalized understanding and agree with plan. Instructed to call the office with any questions and report to ER with any life-threatening symptoms.     Follow Up:   Return in about 1 year (around 2/22/2024).    I spent 30  minutes face to face with the patient. I personally spent 30 percent of this time counseling and discussing diagnosis, diagnostic testing, driving, treatment options and management .       During this visit the following were done:  Labs Reviewed []    Labs Ordered []    Radiology Reports Reviewed []    Radiology Ordered []    PCP Records Reviewed []    Referring Provider Records Reviewed []    ER Records Reviewed []    Hospital Records Reviewed []    History Obtained " From Family []    Radiology Images Reviewed []    Other Reviewed []    Records Requested []      MODESTO Casanova  [facility]  [address]  [facility] [place] [address] [phone]

## 2023-02-22 ENCOUNTER — OFFICE VISIT (OUTPATIENT)
Dept: NEUROLOGY | Facility: CLINIC | Age: 23
End: 2023-02-22
Payer: COMMERCIAL

## 2023-02-22 VITALS
DIASTOLIC BLOOD PRESSURE: 66 MMHG | WEIGHT: 114 LBS | HEART RATE: 88 BPM | HEIGHT: 64 IN | TEMPERATURE: 97.2 F | SYSTOLIC BLOOD PRESSURE: 104 MMHG | BODY MASS INDEX: 19.46 KG/M2 | OXYGEN SATURATION: 99 %

## 2023-02-22 DIAGNOSIS — G35 MULTIPLE SCLEROSIS: Primary | ICD-10-CM

## 2023-02-22 PROCEDURE — 99213 OFFICE O/P EST LOW 20 MIN: CPT | Performed by: NURSE PRACTITIONER

## 2023-06-06 ENCOUNTER — TELEPHONE (OUTPATIENT)
Dept: NEUROLOGY | Facility: CLINIC | Age: 23
End: 2023-06-06

## 2023-06-06 NOTE — TELEPHONE ENCOUNTER
Provider: NICOLA  Caller: JIL  Relationship to Patient: SELF  Phone Number: [phone]  Reason for Call: PT IS REQUESTING AN APPOINTMENT FOR INFUSION. PLEASE CONTACT PT     PLEASE REVIEW    THANK YOU

## 2023-06-13 ENCOUNTER — INFUSION (OUTPATIENT)
Dept: ONCOLOGY | Facility: HOSPITAL | Age: 23
End: 2023-06-13
Payer: COMMERCIAL

## 2023-06-13 VITALS
HEART RATE: 106 BPM | TEMPERATURE: 97.7 F | DIASTOLIC BLOOD PRESSURE: 71 MMHG | RESPIRATION RATE: 18 BRPM | SYSTOLIC BLOOD PRESSURE: 111 MMHG

## 2023-06-13 DIAGNOSIS — G35 MULTIPLE SCLEROSIS: Primary | ICD-10-CM

## 2023-06-13 PROCEDURE — 96415 CHEMO IV INFUSION ADDL HR: CPT

## 2023-06-13 PROCEDURE — 96413 CHEMO IV INFUSION 1 HR: CPT

## 2023-06-13 PROCEDURE — 96375 TX/PRO/DX INJ NEW DRUG ADDON: CPT

## 2023-06-13 PROCEDURE — 25010000002 OCRELIZUMAB 300 MG/10ML SOLUTION 300 MG VIAL: Performed by: NURSE PRACTITIONER

## 2023-06-13 PROCEDURE — 96366 THER/PROPH/DIAG IV INF ADDON: CPT

## 2023-06-13 PROCEDURE — 96365 THER/PROPH/DIAG IV INF INIT: CPT

## 2023-06-13 PROCEDURE — 96376 TX/PRO/DX INJ SAME DRUG ADON: CPT

## 2023-06-13 PROCEDURE — 25010000002 METHYLPREDNISOLONE PER 125 MG: Performed by: NURSE PRACTITIONER

## 2023-06-13 PROCEDURE — 25010000002 DIPHENHYDRAMINE PER 50 MG: Performed by: NURSE PRACTITIONER

## 2023-06-13 RX ORDER — IBUPROFEN 400 MG/1
400 TABLET ORAL EVERY 6 HOURS PRN
OUTPATIENT
Start: 2023-12-12

## 2023-06-13 RX ORDER — DIPHENHYDRAMINE HYDROCHLORIDE 50 MG/ML
25 INJECTION INTRAMUSCULAR; INTRAVENOUS ONCE
OUTPATIENT
Start: 2023-12-12

## 2023-06-13 RX ORDER — FAMOTIDINE 10 MG/ML
20 INJECTION, SOLUTION INTRAVENOUS AS NEEDED
OUTPATIENT
Start: 2023-12-12

## 2023-06-13 RX ORDER — DIPHENHYDRAMINE HYDROCHLORIDE 50 MG/ML
25 INJECTION INTRAMUSCULAR; INTRAVENOUS ONCE
Status: CANCELLED | OUTPATIENT
Start: 2023-10-10

## 2023-06-13 RX ORDER — SODIUM CHLORIDE 9 MG/ML
250 INJECTION, SOLUTION INTRAVENOUS ONCE
Status: COMPLETED | OUTPATIENT
Start: 2023-06-13 | End: 2023-06-13

## 2023-06-13 RX ORDER — ACETAMINOPHEN 325 MG/1
650 TABLET ORAL EVERY 6 HOURS PRN
OUTPATIENT
Start: 2023-12-12

## 2023-06-13 RX ORDER — FAMOTIDINE 10 MG/ML
20 INJECTION, SOLUTION INTRAVENOUS AS NEEDED
Status: DISCONTINUED | OUTPATIENT
Start: 2023-06-13 | End: 2023-06-13 | Stop reason: HOSPADM

## 2023-06-13 RX ORDER — METHYLPREDNISOLONE SODIUM SUCCINATE 125 MG/2ML
100 INJECTION, POWDER, LYOPHILIZED, FOR SOLUTION INTRAMUSCULAR; INTRAVENOUS ONCE
Status: COMPLETED | OUTPATIENT
Start: 2023-06-13 | End: 2023-06-13

## 2023-06-13 RX ORDER — DIPHENHYDRAMINE HYDROCHLORIDE 50 MG/ML
25 INJECTION INTRAMUSCULAR; INTRAVENOUS ONCE
Status: COMPLETED | OUTPATIENT
Start: 2023-06-13 | End: 2023-06-13

## 2023-06-13 RX ORDER — SODIUM CHLORIDE 9 MG/ML
250 INJECTION, SOLUTION INTRAVENOUS ONCE
Status: CANCELLED | OUTPATIENT
Start: 2023-10-10

## 2023-06-13 RX ORDER — IBUPROFEN 400 MG/1
400 TABLET ORAL EVERY 6 HOURS PRN
Status: CANCELLED | OUTPATIENT
Start: 2023-10-10

## 2023-06-13 RX ORDER — METHYLPREDNISOLONE SODIUM SUCCINATE 125 MG/2ML
100 INJECTION, POWDER, LYOPHILIZED, FOR SOLUTION INTRAMUSCULAR; INTRAVENOUS ONCE
Status: CANCELLED | OUTPATIENT
Start: 2023-10-10

## 2023-06-13 RX ORDER — DIPHENHYDRAMINE HYDROCHLORIDE 50 MG/ML
50 INJECTION INTRAMUSCULAR; INTRAVENOUS AS NEEDED
OUTPATIENT
Start: 2023-12-12

## 2023-06-13 RX ORDER — SODIUM CHLORIDE 9 MG/ML
250 INJECTION, SOLUTION INTRAVENOUS ONCE
OUTPATIENT
Start: 2023-12-12

## 2023-06-13 RX ORDER — ACETAMINOPHEN 325 MG/1
650 TABLET ORAL ONCE
Status: CANCELLED | OUTPATIENT
Start: 2023-10-10

## 2023-06-13 RX ORDER — ACETAMINOPHEN 325 MG/1
650 TABLET ORAL ONCE
Status: COMPLETED | OUTPATIENT
Start: 2023-06-13 | End: 2023-06-13

## 2023-06-13 RX ORDER — ACETAMINOPHEN 325 MG/1
650 TABLET ORAL EVERY 6 HOURS PRN
Status: CANCELLED | OUTPATIENT
Start: 2023-10-10

## 2023-06-13 RX ORDER — ONDANSETRON 4 MG/1
4 TABLET, FILM COATED ORAL EVERY 6 HOURS PRN
COMMUNITY
Start: 2023-05-02

## 2023-06-13 RX ORDER — ACETAMINOPHEN 325 MG/1
650 TABLET ORAL ONCE
OUTPATIENT
Start: 2023-12-12

## 2023-06-13 RX ORDER — METHYLPREDNISOLONE SODIUM SUCCINATE 125 MG/2ML
100 INJECTION, POWDER, LYOPHILIZED, FOR SOLUTION INTRAMUSCULAR; INTRAVENOUS ONCE
OUTPATIENT
Start: 2023-12-12

## 2023-06-13 RX ADMIN — FAMOTIDINE 20 MG: 10 INJECTION INTRAVENOUS at 09:24

## 2023-06-13 RX ADMIN — DIPHENHYDRAMINE HYDROCHLORIDE: 50 INJECTION, SOLUTION INTRAMUSCULAR; INTRAVENOUS at 09:27

## 2023-06-13 RX ADMIN — OCRELIZUMAB 600 MG: 300 INJECTION INTRAVENOUS at 08:53

## 2023-06-13 RX ADMIN — DIPHENHYDRAMINE HYDROCHLORIDE 25 MG: 50 INJECTION INTRAMUSCULAR; INTRAVENOUS at 08:15

## 2023-06-13 RX ADMIN — ACETAMINOPHEN 650 MG: 325 TABLET ORAL at 08:15

## 2023-06-13 RX ADMIN — METHYLPREDNISOLONE SODIUM SUCCINATE 100 MG: 125 INJECTION, POWDER, FOR SOLUTION INTRAMUSCULAR; INTRAVENOUS at 08:16

## 2023-06-13 RX ADMIN — SODIUM CHLORIDE 250 ML: 9 INJECTION, SOLUTION INTRAVENOUS at 08:52

## 2023-07-05 NOTE — TELEPHONE ENCOUNTER
Noted. Chart updated. Please resend medication.   The patient is Watcher - Medium risk of patient condition declining or worsening    Shift Goals  Clinical Goals: Neuro checks, MRI, echo, manage headache, monitor for cardiac sx, possible LP  Patient Goals: pain management, go home  Family Goals: jelly    Progress made toward(s) clinical / shift goals:  Pt neuro checks WNL with exception of severe headache. MRI changed to stat, completed and MD notified of results. Echo completed, frequent neuro checks in progress, vitals WNL. LP not possible today due to anticoagulation pt has been on.     Patient is not progressing towards the following goals: Pain pharmacology adjusted to manage severe headache.      Problem: Neuro Status  Goal: Neuro status will remain stable or improve  7/5/2023 1320 by Yaneth Lemons, R.N.  Outcome: Progressing  7/5/2023 1320 by Yaneth Lemons, R.N.  Outcome: Progressing     Problem: Optimal Care of the Stroke Patient  Goal: Optimal acute care for the stroke patient  7/5/2023 1320 by Yaneth Lemons, R.N.  Outcome: Progressing  7/5/2023 1320 by Yaneth Lemons, R.N.  Outcome: Progressing     Problem: Pain - Standard  Goal: Alleviation of pain or a reduction in pain to the patient’s comfort goal  Outcome: Not Progressing

## 2023-08-01 ENCOUNTER — OFFICE VISIT (OUTPATIENT)
Dept: NEUROLOGY | Facility: CLINIC | Age: 23
End: 2023-08-01
Payer: COMMERCIAL

## 2023-08-01 ENCOUNTER — LAB (OUTPATIENT)
Dept: LAB | Facility: HOSPITAL | Age: 23
End: 2023-08-01
Payer: COMMERCIAL

## 2023-08-01 VITALS
HEART RATE: 84 BPM | WEIGHT: 116.6 LBS | SYSTOLIC BLOOD PRESSURE: 106 MMHG | OXYGEN SATURATION: 98 % | DIASTOLIC BLOOD PRESSURE: 68 MMHG | BODY MASS INDEX: 19.91 KG/M2 | HEIGHT: 64 IN

## 2023-08-01 DIAGNOSIS — G35 MULTIPLE SCLEROSIS: ICD-10-CM

## 2023-08-01 DIAGNOSIS — F31.9 BIPOLAR 1 DISORDER: ICD-10-CM

## 2023-08-01 DIAGNOSIS — G43.719 INTRACTABLE CHRONIC MIGRAINE WITHOUT AURA AND WITHOUT STATUS MIGRAINOSUS: ICD-10-CM

## 2023-08-01 DIAGNOSIS — M79.2 NEUROPATHIC PAIN: ICD-10-CM

## 2023-08-01 DIAGNOSIS — G35 MULTIPLE SCLEROSIS: Primary | ICD-10-CM

## 2023-08-01 PROCEDURE — 84165 PROTEIN E-PHORESIS SERUM: CPT

## 2023-08-01 PROCEDURE — 36415 COLL VENOUS BLD VENIPUNCTURE: CPT

## 2023-08-01 PROCEDURE — 84155 ASSAY OF PROTEIN SERUM: CPT

## 2023-08-01 PROCEDURE — 86334 IMMUNOFIX E-PHORESIS SERUM: CPT

## 2023-08-01 PROCEDURE — 82306 VITAMIN D 25 HYDROXY: CPT

## 2023-08-01 PROCEDURE — 99214 OFFICE O/P EST MOD 30 MIN: CPT | Performed by: NURSE PRACTITIONER

## 2023-08-01 PROCEDURE — 82784 ASSAY IGA/IGD/IGG/IGM EACH: CPT

## 2023-08-01 PROCEDURE — 80050 GENERAL HEALTH PANEL: CPT

## 2023-08-01 RX ORDER — AMITRIPTYLINE HYDROCHLORIDE 10 MG/1
10 TABLET, FILM COATED ORAL NIGHTLY
Qty: 30 TABLET | Refills: 5 | Status: SHIPPED | OUTPATIENT
Start: 2023-08-01

## 2023-08-01 RX ORDER — ONDANSETRON 4 MG/1
4 TABLET, FILM COATED ORAL EVERY 6 HOURS PRN
Qty: 24 TABLET | Refills: 5 | Status: SHIPPED | OUTPATIENT
Start: 2023-08-01

## 2023-08-01 RX ORDER — SUMATRIPTAN 100 MG/1
100 TABLET, FILM COATED ORAL
Qty: 9 TABLET | Refills: 5 | Status: SHIPPED | OUTPATIENT
Start: 2023-08-01 | End: 2024-07-31

## 2023-08-01 RX ORDER — MONTELUKAST SODIUM 10 MG/1
10 TABLET ORAL
COMMUNITY
Start: 2023-07-14

## 2023-08-02 LAB
25(OH)D3 SERPL-MCNC: 49.5 NG/ML (ref 30–100)
ALBUMIN SERPL-MCNC: 5.1 G/DL (ref 3.5–5.2)
ALBUMIN/GLOB SERPL: 2.2 G/DL
ALP SERPL-CCNC: 69 U/L (ref 39–117)
ALT SERPL W P-5'-P-CCNC: 16 U/L (ref 1–33)
ANION GAP SERPL CALCULATED.3IONS-SCNC: 13.3 MMOL/L (ref 5–15)
AST SERPL-CCNC: 25 U/L (ref 1–32)
BASOPHILS # BLD AUTO: 0.05 10*3/MM3 (ref 0–0.2)
BASOPHILS NFR BLD AUTO: 1 % (ref 0–1.5)
BILIRUB SERPL-MCNC: 1.8 MG/DL (ref 0–1.2)
BUN SERPL-MCNC: 11 MG/DL (ref 6–20)
BUN/CREAT SERPL: 12.2 (ref 7–25)
CALCIUM SPEC-SCNC: 10 MG/DL (ref 8.6–10.5)
CHLORIDE SERPL-SCNC: 102 MMOL/L (ref 98–107)
CO2 SERPL-SCNC: 24.7 MMOL/L (ref 22–29)
CREAT SERPL-MCNC: 0.9 MG/DL (ref 0.57–1)
DEPRECATED RDW RBC AUTO: 36.6 FL (ref 37–54)
EGFRCR SERPLBLD CKD-EPI 2021: 92.3 ML/MIN/1.73
EOSINOPHIL # BLD AUTO: 0.07 10*3/MM3 (ref 0–0.4)
EOSINOPHIL NFR BLD AUTO: 1.4 % (ref 0.3–6.2)
ERYTHROCYTE [DISTWIDTH] IN BLOOD BY AUTOMATED COUNT: 12.4 % (ref 12.3–15.4)
GLOBULIN UR ELPH-MCNC: 2.3 GM/DL
GLUCOSE SERPL-MCNC: 86 MG/DL (ref 65–99)
HCT VFR BLD AUTO: 47.1 % (ref 34–46.6)
HGB BLD-MCNC: 15.1 G/DL (ref 12–15.9)
IMM GRANULOCYTES # BLD AUTO: 0.01 10*3/MM3 (ref 0–0.05)
IMM GRANULOCYTES NFR BLD AUTO: 0.2 % (ref 0–0.5)
LYMPHOCYTES # BLD AUTO: 1.42 10*3/MM3 (ref 0.7–3.1)
LYMPHOCYTES NFR BLD AUTO: 28.6 % (ref 19.6–45.3)
MCH RBC QN AUTO: 26.2 PG (ref 26.6–33)
MCHC RBC AUTO-ENTMCNC: 32.1 G/DL (ref 31.5–35.7)
MCV RBC AUTO: 81.8 FL (ref 79–97)
MONOCYTES # BLD AUTO: 0.58 10*3/MM3 (ref 0.1–0.9)
MONOCYTES NFR BLD AUTO: 11.7 % (ref 5–12)
NEUTROPHILS NFR BLD AUTO: 2.84 10*3/MM3 (ref 1.7–7)
NEUTROPHILS NFR BLD AUTO: 57.1 % (ref 42.7–76)
NRBC BLD AUTO-RTO: 0 /100 WBC (ref 0–0.2)
PLATELET # BLD AUTO: 240 10*3/MM3 (ref 140–450)
PMV BLD AUTO: 12.3 FL (ref 6–12)
POTASSIUM SERPL-SCNC: 4.3 MMOL/L (ref 3.5–5.2)
PROT SERPL-MCNC: 7.4 G/DL (ref 6–8.5)
RBC # BLD AUTO: 5.76 10*6/MM3 (ref 3.77–5.28)
SODIUM SERPL-SCNC: 140 MMOL/L (ref 136–145)
TSH SERPL DL<=0.05 MIU/L-ACNC: 0.6 UIU/ML (ref 0.27–4.2)
WBC NRBC COR # BLD: 4.97 10*3/MM3 (ref 3.4–10.8)

## 2023-08-03 LAB
ALBUMIN SERPL ELPH-MCNC: 4.3 G/DL (ref 2.9–4.4)
ALBUMIN/GLOB SERPL: 1.4 {RATIO} (ref 0.7–1.7)
ALPHA1 GLOB SERPL ELPH-MCNC: 0.3 G/DL (ref 0–0.4)
ALPHA2 GLOB SERPL ELPH-MCNC: 0.8 G/DL (ref 0.4–1)
B-GLOBULIN SERPL ELPH-MCNC: 1.1 G/DL (ref 0.7–1.3)
GAMMA GLOB SERPL ELPH-MCNC: 1.1 G/DL (ref 0.4–1.8)
GLOBULIN SER-MCNC: 3.2 G/DL (ref 2.2–3.9)
IGA SERPL-MCNC: 200 MG/DL (ref 87–352)
IGG SERPL-MCNC: 1017 MG/DL (ref 586–1602)
IGM SERPL-MCNC: 65 MG/DL (ref 26–217)
INTERPRETATION SERPL IEP-IMP: NORMAL
LABORATORY COMMENT REPORT: NORMAL
M PROTEIN SERPL ELPH-MCNC: NORMAL G/DL
PROT SERPL-MCNC: 7.5 G/DL (ref 6–8.5)

## 2023-08-08 ENCOUNTER — TELEPHONE (OUTPATIENT)
Dept: NEUROLOGY | Facility: CLINIC | Age: 23
End: 2023-08-08
Payer: COMMERCIAL

## 2023-08-08 NOTE — TELEPHONE ENCOUNTER
Called patient and gave provider's note.  Patient stated she will not need referral.      Called patient and she is concerned about bilirubin being high since starting Ocrevus.      ----- Message from MODESTO Casanova sent at 8/7/2023  4:13 PM EDT -----  Please let the patient know that lab values show no concerning findings.  She has a few values on the CBC that are slightly out of the normal range but not enough to be a significant finding.  Thanks, BJ

## 2023-09-12 ENCOUNTER — HOSPITAL ENCOUNTER (OUTPATIENT)
Dept: MRI IMAGING | Facility: HOSPITAL | Age: 23
Discharge: HOME | End: 2023-09-12
Payer: COMMERCIAL

## 2023-09-12 DIAGNOSIS — G35 MULTIPLE SCLEROSIS: ICD-10-CM

## 2023-09-12 PROCEDURE — 70553 MRI BRAIN STEM W/O & W/DYE: CPT

## 2023-09-12 PROCEDURE — 72156 MRI NECK SPINE W/O & W/DYE: CPT

## 2023-09-12 PROCEDURE — A9577 INJ MULTIHANCE: HCPCS | Performed by: NURSE PRACTITIONER

## 2023-09-12 PROCEDURE — 72157 MRI CHEST SPINE W/O & W/DYE: CPT

## 2023-09-12 PROCEDURE — 0 GADOBENATE DIMEGLUMINE 529 MG/ML SOLUTION: Performed by: NURSE PRACTITIONER

## 2023-09-12 RX ADMIN — GADOBENATE DIMEGLUMINE 12 ML: 529 INJECTION, SOLUTION INTRAVENOUS at 15:56

## 2023-09-13 ENCOUNTER — HOSPITAL ENCOUNTER (EMERGENCY)
Facility: HOSPITAL | Age: 23
Discharge: HOME | End: 2023-09-13
Attending: EMERGENCY MEDICINE
Payer: COMMERCIAL

## 2023-09-13 ENCOUNTER — APPOINTMENT (OUTPATIENT)
Dept: CT IMAGING | Facility: HOSPITAL | Age: 23
End: 2023-09-13
Payer: COMMERCIAL

## 2023-09-13 VITALS
TEMPERATURE: 98.8 F | HEART RATE: 113 BPM | SYSTOLIC BLOOD PRESSURE: 100 MMHG | WEIGHT: 115 LBS | OXYGEN SATURATION: 100 % | RESPIRATION RATE: 19 BRPM | HEIGHT: 64 IN | BODY MASS INDEX: 19.63 KG/M2 | DIASTOLIC BLOOD PRESSURE: 68 MMHG

## 2023-09-13 DIAGNOSIS — R19.7 BLOODY DIARRHEA: Primary | ICD-10-CM

## 2023-09-13 LAB
ALBUMIN SERPL-MCNC: 5.1 G/DL (ref 3.5–5.2)
ALBUMIN/GLOB SERPL: 2.2 G/DL
ALP SERPL-CCNC: 67 U/L (ref 39–117)
ALT SERPL W P-5'-P-CCNC: 13 U/L (ref 1–33)
ANION GAP SERPL CALCULATED.3IONS-SCNC: 10.9 MMOL/L (ref 5–15)
AST SERPL-CCNC: 17 U/L (ref 1–32)
B-HCG UR QL: NEGATIVE
BASOPHILS # BLD AUTO: 0.04 10*3/MM3 (ref 0–0.2)
BASOPHILS NFR BLD AUTO: 0.4 % (ref 0–1.5)
BILIRUB SERPL-MCNC: 1.1 MG/DL (ref 0–1.2)
BILIRUB UR QL STRIP: NEGATIVE
BUN SERPL-MCNC: 10 MG/DL (ref 6–20)
BUN/CREAT SERPL: 12 (ref 7–25)
CALCIUM SPEC-SCNC: 10.4 MG/DL (ref 8.6–10.5)
CHLORIDE SERPL-SCNC: 101 MMOL/L (ref 98–107)
CLARITY UR: CLEAR
CO2 SERPL-SCNC: 27.1 MMOL/L (ref 22–29)
COLOR UR: YELLOW
CREAT SERPL-MCNC: 0.83 MG/DL (ref 0.57–1)
DEPRECATED RDW RBC AUTO: 38.5 FL (ref 37–54)
EGFRCR SERPLBLD CKD-EPI 2021: 101.7 ML/MIN/1.73
EOSINOPHIL # BLD AUTO: 0.01 10*3/MM3 (ref 0–0.4)
EOSINOPHIL NFR BLD AUTO: 0.1 % (ref 0.3–6.2)
ERYTHROCYTE [DISTWIDTH] IN BLOOD BY AUTOMATED COUNT: 13.1 % (ref 12.3–15.4)
GLOBULIN UR ELPH-MCNC: 2.3 GM/DL
GLUCOSE SERPL-MCNC: 99 MG/DL (ref 65–99)
GLUCOSE UR STRIP-MCNC: NEGATIVE MG/DL
HCT VFR BLD AUTO: 42.1 % (ref 34–46.6)
HGB BLD-MCNC: 13.4 G/DL (ref 12–15.9)
HGB UR QL STRIP.AUTO: NEGATIVE
IMM GRANULOCYTES # BLD AUTO: 0.02 10*3/MM3 (ref 0–0.05)
IMM GRANULOCYTES NFR BLD AUTO: 0.2 % (ref 0–0.5)
KETONES UR QL STRIP: NEGATIVE
LEUKOCYTE ESTERASE UR QL STRIP.AUTO: NEGATIVE
LIPASE SERPL-CCNC: 26 U/L (ref 13–60)
LYMPHOCYTES # BLD AUTO: 1.51 10*3/MM3 (ref 0.7–3.1)
LYMPHOCYTES NFR BLD AUTO: 16.8 % (ref 19.6–45.3)
MCH RBC QN AUTO: 25.8 PG (ref 26.6–33)
MCHC RBC AUTO-ENTMCNC: 31.8 G/DL (ref 31.5–35.7)
MCV RBC AUTO: 81 FL (ref 79–97)
MONOCYTES # BLD AUTO: 0.77 10*3/MM3 (ref 0.1–0.9)
MONOCYTES NFR BLD AUTO: 8.5 % (ref 5–12)
NEUTROPHILS NFR BLD AUTO: 6.66 10*3/MM3 (ref 1.7–7)
NEUTROPHILS NFR BLD AUTO: 74 % (ref 42.7–76)
NITRITE UR QL STRIP: NEGATIVE
NRBC BLD AUTO-RTO: 0 /100 WBC (ref 0–0.2)
PH UR STRIP.AUTO: 7 [PH] (ref 5–8)
PLATELET # BLD AUTO: 228 10*3/MM3 (ref 140–450)
PMV BLD AUTO: 11.6 FL (ref 6–12)
POTASSIUM SERPL-SCNC: 3.6 MMOL/L (ref 3.5–5.2)
PROT SERPL-MCNC: 7.4 G/DL (ref 6–8.5)
PROT UR QL STRIP: NEGATIVE
RBC # BLD AUTO: 5.2 10*6/MM3 (ref 3.77–5.28)
SODIUM SERPL-SCNC: 139 MMOL/L (ref 136–145)
SP GR UR STRIP: <=1.005 (ref 1–1.03)
UROBILINOGEN UR QL STRIP: NORMAL
WBC NRBC COR # BLD: 9.01 10*3/MM3 (ref 3.4–10.8)

## 2023-09-13 PROCEDURE — 81003 URINALYSIS AUTO W/O SCOPE: CPT | Performed by: PHYSICIAN ASSISTANT

## 2023-09-13 PROCEDURE — 25510000001 IOPAMIDOL 61 % SOLUTION: Performed by: EMERGENCY MEDICINE

## 2023-09-13 PROCEDURE — 80053 COMPREHEN METABOLIC PANEL: CPT | Performed by: PHYSICIAN ASSISTANT

## 2023-09-13 PROCEDURE — 83690 ASSAY OF LIPASE: CPT | Performed by: PHYSICIAN ASSISTANT

## 2023-09-13 PROCEDURE — 99285 EMERGENCY DEPT VISIT HI MDM: CPT

## 2023-09-13 PROCEDURE — 36415 COLL VENOUS BLD VENIPUNCTURE: CPT

## 2023-09-13 PROCEDURE — 81025 URINE PREGNANCY TEST: CPT | Performed by: PHYSICIAN ASSISTANT

## 2023-09-13 PROCEDURE — 85025 COMPLETE CBC W/AUTO DIFF WBC: CPT | Performed by: PHYSICIAN ASSISTANT

## 2023-09-13 PROCEDURE — 74177 CT ABD & PELVIS W/CONTRAST: CPT

## 2023-09-13 RX ORDER — ONDANSETRON 4 MG/1
4 TABLET, ORALLY DISINTEGRATING ORAL EVERY 6 HOURS PRN
Qty: 20 TABLET | Refills: 0 | Status: SHIPPED | OUTPATIENT
Start: 2023-09-13

## 2023-09-13 RX ORDER — DICYCLOMINE HCL 20 MG
20 TABLET ORAL EVERY 6 HOURS PRN
Qty: 20 TABLET | Refills: 0 | Status: SHIPPED | OUTPATIENT
Start: 2023-09-13

## 2023-09-13 RX ADMIN — IOPAMIDOL 100 ML: 612 INJECTION, SOLUTION INTRAVENOUS at 13:36

## 2023-09-13 RX ADMIN — SODIUM CHLORIDE 1000 ML: 9 INJECTION, SOLUTION INTRAVENOUS at 12:58

## 2023-09-13 NOTE — ED PROVIDER NOTES
Subjective  History of Present Illness:    Chief Complaint:   Chief Complaint   Patient presents with    Rectal Bleeding      History of Present Illness: Trisha Payne is a 23 y.o. female who presents to the emergency department complaining of lower abdominal cramping, watery diarrhea, bright red blood in stool with clots.  Patient states Monday was constipated had a large hard BM some rectal discomfort and thought she may have just aggravated hemorrhoid that she knows she has however started to develop lower abdominal cramping worse when having a bowel movement, increasing the large amounts of bright red blood in her stool with clots.  No fever or chills.  Nausea no vomiting.  No history of abdominal surgeries.  No urinary symptoms.  Denies chance of pregnancy.  Here for further evaluation.  Onset: Monday  Duration: Remittent, worsening  Exacerbating / Alleviating factors: Worse when having a bowel movement worse with palpation of the lower abdomen  Associated symptoms: None      Nurses Notes reviewed and agree, including vitals, allergies, social history and prior medical history.     Review of Systems   Gastrointestinal:  Positive for abdominal pain, blood in stool, diarrhea and nausea.     Past Medical History:   Diagnosis Date    Anemia     Anxiety     Bipolar disorder     Depression     Ovarian cyst        Allergies:    Bactrim [sulfamethoxazole-trimethoprim], Doxycycline, and Trileptal [oxcarbazepine]      Past Surgical History:   Procedure Laterality Date    EAR TUBES           Social History     Socioeconomic History    Marital status: Single   Tobacco Use    Smoking status: Never    Smokeless tobacco: Never   Vaping Use    Vaping Use: Never used   Substance and Sexual Activity    Alcohol use: No    Drug use: No    Sexual activity: Yes     Partners: Male     Birth control/protection: Condom         Family History   Problem Relation Age of Onset    Hypertension Paternal Grandfather     Thyroid cancer  "Maternal Grandmother     Mental illness Mother     Migraines Mother     Diabetes Father        Objective  Physical Exam:  /68   Pulse 113   Temp 98.8 °F (37.1 °C) (Oral)   Resp 19   Ht 162.6 cm (64\")   Wt 52.2 kg (115 lb)   SpO2 100%   BMI 19.74 kg/m²      Physical Exam      Procedures    ED Course:    ED Course as of 09/13/23 1449   Wed Sep 13, 2023   1304 WBC: 9.01 [TM]   1304 Hemoglobin: 13.4 [TM]   1304 Hematocrit: 42.1 [TM]      ED Course User Index  [TM] Darin Ferraro PA-C       Lab Results (last 24 hours)       Procedure Component Value Units Date/Time    CBC & Differential [[id number]]  (Abnormal) Collected: 09/13/23 1258    Specimen: Blood Updated: 09/13/23 1304    Narrative:      The following orders were created for panel order CBC & Differential.  Procedure                               Abnormality         Status                     ---------                               -----------         ------                     CBC Auto Differential[[id number]]        Abnormal            Final result                 Please view results for these tests on the individual orders.    Comprehensive Metabolic Panel [[id number]] Collected: 09/13/23 1258    Specimen: Blood Updated: 09/13/23 1327     Glucose 99 mg/dL      BUN 10 mg/dL      Creatinine 0.83 mg/dL      Sodium 139 mmol/L      Potassium 3.6 mmol/L      Chloride 101 mmol/L      CO2 27.1 mmol/L      Calcium 10.4 mg/dL      Total Protein 7.4 g/dL      Albumin 5.1 g/dL      ALT (SGPT) 13 U/L      AST (SGOT) 17 U/L      Alkaline Phosphatase 67 U/L      Total Bilirubin 1.1 mg/dL      Globulin 2.3 gm/dL      A/G Ratio 2.2 g/dL      BUN/Creatinine Ratio 12.0     Anion Gap 10.9 mmol/L      eGFR 101.7 mL/min/1.73     Narrative:      GFR Normal >60  Chronic Kidney Disease <60  Kidney Failure <15      Lipase [[id number]]  (Normal) Collected: 09/13/23 1258    Specimen: Blood Updated: 09/13/23 1327     Lipase 26 U/L     Pregnancy, Urine - Urine, Clean " Catch [[id number]]  (Normal) Collected: 09/13/23 1258    Specimen: Urine, Clean Catch Updated: 09/13/23 1306     HCG, Urine QL Negative    Urinalysis With Microscopic If Indicated (No Culture) - Urine, Clean Catch [[id number]]  (Normal) Collected: 09/13/23 1258    Specimen: Urine, Clean Catch Updated: 09/13/23 1306     Color, UA Yellow     Appearance, UA Clear     pH, UA 7.0     Specific Gravity, UA <=1.005     Glucose, UA Negative     Ketones, UA Negative     Bilirubin, UA Negative     Blood, UA Negative     Protein, UA Negative     Leuk Esterase, UA Negative     Nitrite, UA Negative     Urobilinogen, UA 0.2 E.U./dL    Narrative:      Urine microscopic not indicated.    CBC Auto Differential [[id number]]  (Abnormal) Collected: 09/13/23 1258    Specimen: Blood Updated: 09/13/23 1304     WBC 9.01 10*3/mm3      RBC 5.20 10*6/mm3      Hemoglobin 13.4 g/dL      Hematocrit 42.1 %      MCV 81.0 fL      MCH 25.8 pg      MCHC 31.8 g/dL      RDW 13.1 %      RDW-SD 38.5 fl      MPV 11.6 fL      Platelets 228 10*3/mm3      Neutrophil % 74.0 %      Lymphocyte % 16.8 %      Monocyte % 8.5 %      Eosinophil % 0.1 %      Basophil % 0.4 %      Immature Grans % 0.2 %      Neutrophils, Absolute 6.66 10*3/mm3      Lymphocytes, Absolute 1.51 10*3/mm3      Monocytes, Absolute 0.77 10*3/mm3      Eosinophils, Absolute 0.01 10*3/mm3      Basophils, Absolute 0.04 10*3/mm3      Immature Grans, Absolute 0.02 10*3/mm3      nRBC 0.0 /100 WBC              MRI Brain With & Without Contrast    Result Date: 9/12/2023  PROCEDURE: MRI BRAIN W WO CONTRAST-  HISTORY: Multiple sclerosis (MS); G35-Multiple sclerosis  COMPARISON: May 17, 2022.  FINDINGS: Multiplanar MR imaging of the head was performed  without and with contrast. There are somewhat low-lying cerebellar tonsils which are stable, and without evidence of Chiari malformation. Multiple small foci of increased T2 signal are seen in the bilateral cerebral white matter measuring less than 1 cm.  These are stable in size and number since the prior examination. These are consistent with areas of demyelination in this patient with a history of multiple sclerosis. No new area of demyelination is identified.   There is no evidence of intracranial hemorrhage or mass.  The ventricles are within normal limits with respect to size.  There is  no evidence of shift of the midline structures.   No abnormal extra-axial fluid collection is seen. The posterior fossa and brainstem have an unremarkable appearance.  No abnormal contrast enhancement is identified. On the diffusion weighted images,  no abnormal restricted diffusion is seen.  Normal major vessel vascular flow voids are identified.      Impression: Stable cerebral white matter signal abnormalities consistent with the history of multiple sclerosis.  No new abnormality identified to suggest a new area of demyelination.  No evidence of abnormal contrast enhancement.      This report was signed and finalized on 9/12/2023 6:25 PM by Mauricio Fernando MD.      MRI Cervical Spine With & Without Contrast    Result Date: 9/12/2023  PROCEDURE: MRI CERVICAL SPINE W WO CONTRAST-  HISTORY: Multiple sclerosis (MS); G35-Multiple sclerosis  Comparison: May 17, 2022   FINDINGS: Multiplanar MR imaging of the cervical spine was performed without and with contrast. The small focus of presumed demyelination in the right dorsal cord at the C2 level is no longer visualized and is likely improved. A 2-3 mm focus of increased T2 signal in the dorsal cord at C7 is visually stable and likely represents a small focus of demyelination. No new area of demyelination is identified. There is no evidence of cervical spinal cord mass, edema or syrinx. Mild disc degeneration is seen at multiple levels.  On the axial images, no focal disc protrusion is identified. There is a mild disc bulge at C6-7. There is no evidence of central canal stenosis or neural foraminal narrowing.  No abnormal contrast  enhancement is identified.       Impression: Interval improvement in the small focus of demyelination in the right dorsal cord at C2.  Stable small focus of presumed demyelination in the dorsal cord at C7.  No new abnormality identified to suggest a new area of demyelination.  No abnormal contrast enhancement identified.   This report was signed and finalized on 9/12/2023 6:31 PM by Mauricio Fernando MD.      MRI Thoracic Spine With & Without Contrast    Result Date: 9/12/2023   PROCEDURE: MRI THORACIC SPINE W WO CONTRAST-  HISTORY: Multiple sclerosis (MS); G35-Multiple sclerosis  FINDINGS: Multiplanar MR imaging of the thoracic spine was performed without and with contrast. The images are somewhat degraded by motion artifact. No well-defined area of cord demyelination is identified. There is no evidence of thoracic spinal cord mass, edema or syrinx. There is no evidence of significant central canal stenosis.  No significant disc degeneration is seen. No fracture is identified. The vertebral alignment is normal.  On the axial images, no focal disc protrusion is identified. No significant central canal stenosis is seen. No paraspinous soft tissue abnormality is identified.  On the postcontrast images, no abnormal contrast enhancement is identified.       Impression: No definite area of thoracic cord demyelination identified.  No abnormal contrast enhancement.   This report was signed and finalized on 9/12/2023 6:35 PM by Mauricio Fernando MD.      CT Abdomen Pelvis With Contrast    Result Date: 9/13/2023  PROCEDURE: CT ABDOMEN PELVIS W CONTRAST-  HISTORY:  lower abdominal pain, blood in stool  COMPARISON:  None .  TECHNIQUE: Multiple axial CT images were obtained from the lung bases through the pubic symphysis following the administration of Isovue 300 and oral contrast.  FINDINGS:  ABDOMEN: A paucity of intra-abdominal fat limits the exam. The lung bases are clear. The heart is normal in size. The liver is normal. The  gallbladder is present. There are no CT visualized gallstones. The spleen is unremarkable. No adrenal mass is present.  The pancreas is normal. The kidneys are normal. The aorta is normal in caliber. There is no free fluid or adenopathy. There are air-fluid levels in nondistended small and large bowel which may be due to enteritis.  PELVIS: The appendix is not identified. There is an IUD in the uterus. The urinary bladder is unremarkable. There is minimal free fluid in the pelvis, likely physiologic.      Impression: Air-fluid levels in nondistended small and large bowel may be due to enteritis.   CTDI: 3.29 mGy DLP: 149.38 mGy.cm   This study was performed with techniques to keep radiation doses as low as reasonably achievable (ALARA). Individualized dose reduction techniques using automated exposure control or adjustment of mA and/or kV according to the patient size were employed.      Images were reviewed, interpreted, and dictated by Dr. Kesha Hou MD Transcribed by Vicky Vincent PA-C.          Medical Decision Making  Problems Addressed:  Bloody diarrhea: complicated acute illness or injury    Amount and/or Complexity of Data Reviewed  Labs: ordered. Decision-making details documented in ED Course.  Radiology: ordered.    Risk  Prescription drug management.        Trisha Payne is a 23 y.o. female who presents to the emergency department for evaluation of abdominal pain, bloody diarrhea    Differential diagnosis includes this, diverticulitis, enteritis, C. difficile among other etiologies.    CBC, CMP, lipase, urinalysis, urine pregnancy test, CT scan abdomen pelvis ordered for further evaluation of the patient's presentation.    Chart review if available included outside testing, previous visits, prior labs, prior imaging, available notes from prior evaluations or visits with specialists, medication list, allergies, past medical history, past surgical history when applicable.    Patient was treated  with normal saline    Patient resting comfortably in bed, abdomen soft mild tenderness but no rebound or guarding.  Has not had a bowel movement here. Is on Ocrevus of MS. Did just take Cipro, will add C. difficile testing, unable to provide stool sample here we will also add GI PCR panel instructions given to bring back.  Return to care precautions given.    Plan for disposition is discharged home.  Patient/family comfortable with and understanding of the plan.      Final diagnoses:   Bloody diarrhea          Darin Ferraro PA-C  09/13/23 6084

## 2023-09-13 NOTE — DISCHARGE INSTRUCTIONS
Please bring back a stool sample as we discussed. Return to the ED for any new or worsening symptoms.

## 2023-11-17 ENCOUNTER — TELEPHONE (OUTPATIENT)
Dept: URGENT CARE | Facility: CLINIC | Age: 23
End: 2023-11-17
Payer: COMMERCIAL

## 2023-11-17 DIAGNOSIS — J02.9 ACUTE SORE THROAT: Primary | ICD-10-CM

## 2023-11-17 RX ORDER — PENICILLIN V POTASSIUM 500 MG/1
500 TABLET ORAL 2 TIMES DAILY
Qty: 20 TABLET | Refills: 0 | Status: SHIPPED | OUTPATIENT
Start: 2023-11-17 | End: 2023-11-27

## 2023-12-06 RX ORDER — SODIUM CHLORIDE 9 MG/ML
250 INJECTION, SOLUTION INTRAVENOUS ONCE
OUTPATIENT
Start: 2023-12-06

## 2023-12-06 RX ORDER — ACETAMINOPHEN 325 MG/1
650 TABLET ORAL EVERY 6 HOURS PRN
OUTPATIENT
Start: 2023-12-06

## 2023-12-06 RX ORDER — FAMOTIDINE 10 MG/ML
20 INJECTION, SOLUTION INTRAVENOUS AS NEEDED
OUTPATIENT
Start: 2023-12-06

## 2023-12-06 RX ORDER — ACETAMINOPHEN 325 MG/1
650 TABLET ORAL ONCE
OUTPATIENT
Start: 2023-12-06

## 2023-12-06 RX ORDER — DIPHENHYDRAMINE HYDROCHLORIDE 50 MG/ML
25 INJECTION INTRAMUSCULAR; INTRAVENOUS ONCE
OUTPATIENT
Start: 2023-12-06

## 2023-12-06 RX ORDER — DIPHENHYDRAMINE HYDROCHLORIDE 50 MG/ML
50 INJECTION INTRAMUSCULAR; INTRAVENOUS AS NEEDED
OUTPATIENT
Start: 2023-12-06

## 2023-12-06 RX ORDER — IBUPROFEN 400 MG/1
400 TABLET ORAL EVERY 6 HOURS PRN
OUTPATIENT
Start: 2023-12-06

## 2023-12-12 ENCOUNTER — INFUSION (OUTPATIENT)
Dept: ONCOLOGY | Facility: HOSPITAL | Age: 23
End: 2023-12-12
Payer: COMMERCIAL

## 2023-12-12 VITALS
TEMPERATURE: 97 F | HEART RATE: 112 BPM | SYSTOLIC BLOOD PRESSURE: 110 MMHG | RESPIRATION RATE: 18 BRPM | DIASTOLIC BLOOD PRESSURE: 63 MMHG

## 2023-12-12 DIAGNOSIS — G35 MULTIPLE SCLEROSIS: Primary | ICD-10-CM

## 2023-12-12 PROCEDURE — 96376 TX/PRO/DX INJ SAME DRUG ADON: CPT

## 2023-12-12 PROCEDURE — 25010000002 HYDROCORTISONE SOD SUC (PF) 100 MG RECONSTITUTED SOLUTION: Performed by: NURSE PRACTITIONER

## 2023-12-12 PROCEDURE — 25010000002 DIPHENHYDRAMINE PER 50 MG: Performed by: NURSE PRACTITIONER

## 2023-12-12 PROCEDURE — 96365 THER/PROPH/DIAG IV INF INIT: CPT

## 2023-12-12 PROCEDURE — 96375 TX/PRO/DX INJ NEW DRUG ADDON: CPT

## 2023-12-12 PROCEDURE — 25010000002 OCRELIZUMAB 300 MG/10ML SOLUTION 300 MG VIAL: Performed by: NURSE PRACTITIONER

## 2023-12-12 PROCEDURE — 25010000002 METHYLPREDNISOLONE PER 125 MG: Performed by: NURSE PRACTITIONER

## 2023-12-12 PROCEDURE — 25810000003 SODIUM CHLORIDE 0.9 % SOLUTION: Performed by: NURSE PRACTITIONER

## 2023-12-12 PROCEDURE — 25810000003 SODIUM CHLORIDE 0.9 % SOLUTION 500 ML FLEX CONT: Performed by: NURSE PRACTITIONER

## 2023-12-12 PROCEDURE — 96413 CHEMO IV INFUSION 1 HR: CPT

## 2023-12-12 PROCEDURE — 96366 THER/PROPH/DIAG IV INF ADDON: CPT

## 2023-12-12 PROCEDURE — 96415 CHEMO IV INFUSION ADDL HR: CPT

## 2023-12-12 RX ORDER — ACETAMINOPHEN 325 MG/1
650 TABLET ORAL EVERY 6 HOURS PRN
OUTPATIENT
Start: 2024-06-11

## 2023-12-12 RX ORDER — IBUPROFEN 400 MG/1
400 TABLET ORAL EVERY 6 HOURS PRN
OUTPATIENT
Start: 2024-06-11

## 2023-12-12 RX ORDER — SODIUM CHLORIDE 9 MG/ML
250 INJECTION, SOLUTION INTRAVENOUS ONCE
OUTPATIENT
Start: 2024-06-11

## 2023-12-12 RX ORDER — ACETAMINOPHEN 325 MG/1
650 TABLET ORAL ONCE
Status: COMPLETED | OUTPATIENT
Start: 2023-12-12 | End: 2023-12-12

## 2023-12-12 RX ORDER — DIPHENHYDRAMINE HYDROCHLORIDE 50 MG/ML
50 INJECTION INTRAMUSCULAR; INTRAVENOUS AS NEEDED
OUTPATIENT
Start: 2024-06-11

## 2023-12-12 RX ORDER — METHYLPREDNISOLONE SODIUM SUCCINATE 125 MG/2ML
100 INJECTION, POWDER, LYOPHILIZED, FOR SOLUTION INTRAMUSCULAR; INTRAVENOUS ONCE
OUTPATIENT
Start: 2024-06-11

## 2023-12-12 RX ORDER — SODIUM CHLORIDE 9 MG/ML
250 INJECTION, SOLUTION INTRAVENOUS ONCE
Status: COMPLETED | OUTPATIENT
Start: 2023-12-12 | End: 2023-12-12

## 2023-12-12 RX ORDER — ACETAMINOPHEN 325 MG/1
650 TABLET ORAL ONCE
OUTPATIENT
Start: 2024-06-11

## 2023-12-12 RX ORDER — METHYLPREDNISOLONE SODIUM SUCCINATE 125 MG/2ML
100 INJECTION, POWDER, LYOPHILIZED, FOR SOLUTION INTRAMUSCULAR; INTRAVENOUS ONCE
Status: COMPLETED | OUTPATIENT
Start: 2023-12-12 | End: 2023-12-12

## 2023-12-12 RX ORDER — FAMOTIDINE 10 MG/ML
20 INJECTION, SOLUTION INTRAVENOUS AS NEEDED
Status: DISCONTINUED | OUTPATIENT
Start: 2023-12-12 | End: 2023-12-12 | Stop reason: HOSPADM

## 2023-12-12 RX ORDER — DIPHENHYDRAMINE HYDROCHLORIDE 50 MG/ML
25 INJECTION INTRAMUSCULAR; INTRAVENOUS ONCE
Status: COMPLETED | OUTPATIENT
Start: 2023-12-12 | End: 2023-12-12

## 2023-12-12 RX ORDER — FAMOTIDINE 10 MG/ML
20 INJECTION, SOLUTION INTRAVENOUS AS NEEDED
OUTPATIENT
Start: 2024-06-11

## 2023-12-12 RX ORDER — DIPHENHYDRAMINE HYDROCHLORIDE 50 MG/ML
25 INJECTION INTRAMUSCULAR; INTRAVENOUS ONCE
OUTPATIENT
Start: 2024-06-11

## 2023-12-12 RX ADMIN — ACETAMINOPHEN 650 MG: 325 TABLET ORAL at 08:22

## 2023-12-12 RX ADMIN — DIPHENHYDRAMINE HYDROCHLORIDE 25 MG: 50 INJECTION INTRAMUSCULAR; INTRAVENOUS at 09:36

## 2023-12-12 RX ADMIN — HYDROCORTISONE SODIUM SUCCINATE 100 MG: 100 INJECTION, POWDER, FOR SOLUTION INTRAMUSCULAR; INTRAVENOUS at 09:38

## 2023-12-12 RX ADMIN — OCRELIZUMAB 600 MG: 300 INJECTION INTRAVENOUS at 09:04

## 2023-12-12 RX ADMIN — METHYLPREDNISOLONE SODIUM SUCCINATE 100 MG: 125 INJECTION, POWDER, FOR SOLUTION INTRAMUSCULAR; INTRAVENOUS at 08:19

## 2023-12-12 RX ADMIN — FAMOTIDINE 20 MG: 10 INJECTION INTRAVENOUS at 08:20

## 2023-12-12 RX ADMIN — DIPHENHYDRAMINE HYDROCHLORIDE: 50 INJECTION INTRAMUSCULAR; INTRAVENOUS at 08:19

## 2023-12-12 RX ADMIN — SODIUM CHLORIDE 250 ML: 9 INJECTION, SOLUTION INTRAVENOUS at 08:18

## 2023-12-12 NOTE — PROGRESS NOTES
"30 minutes into Ocrevus infusion, patient c/o her throat feeling \"prickly\". Ocrevus infusion stopped, saline bolus initiated. MD notified and emergency meds administered. 30 minutes post emergency med administration, pt reported full alleviation of adverse symptoms. Ocrevus restarted at original start rate.   "

## 2024-03-01 ENCOUNTER — TELEPHONE (OUTPATIENT)
Dept: NEUROLOGY | Facility: CLINIC | Age: 24
End: 2024-03-01

## 2024-03-01 ENCOUNTER — OFFICE VISIT (OUTPATIENT)
Dept: NEUROLOGY | Facility: CLINIC | Age: 24
End: 2024-03-01
Payer: COMMERCIAL

## 2024-03-01 VITALS
WEIGHT: 120 LBS | HEART RATE: 112 BPM | SYSTOLIC BLOOD PRESSURE: 104 MMHG | BODY MASS INDEX: 20.49 KG/M2 | DIASTOLIC BLOOD PRESSURE: 56 MMHG | HEIGHT: 64 IN | OXYGEN SATURATION: 98 %

## 2024-03-01 DIAGNOSIS — M79.2 NEUROPATHIC PAIN: Chronic | ICD-10-CM

## 2024-03-01 DIAGNOSIS — G43.C0 PERIODIC HEADACHE SYNDROME, NOT INTRACTABLE: Chronic | ICD-10-CM

## 2024-03-01 DIAGNOSIS — G35 MULTIPLE SCLEROSIS: Primary | ICD-10-CM

## 2024-03-01 DIAGNOSIS — R53.82 CHRONIC FATIGUE: ICD-10-CM

## 2024-03-01 PROCEDURE — 99214 OFFICE O/P EST MOD 30 MIN: CPT | Performed by: PSYCHIATRY & NEUROLOGY

## 2024-03-01 RX ORDER — ARMODAFINIL 250 MG/1
250 TABLET ORAL DAILY
Qty: 30 TABLET | Refills: 5 | Status: SHIPPED | OUTPATIENT
Start: 2024-03-01 | End: 2024-08-28

## 2024-03-01 NOTE — PROGRESS NOTES
Chief Complaint  Multiple Sclerosis    Subjective        Trisha Payne presents to [facility] NEUROLOGY    History of Present Illness    23 y.o. female returns in follow up.  Last visit on 8/1/23 continued Ocrevus, Imitrex, Elavil, ordered MRI B/C/T, labs.       MRI B/C/T, my review of films, 9/12/23  as compared to 5/17/22 mild T2 PVWM lesions,cervical cord lesion C7, no enhancement    Tolerating Ocrevus       Pain in hips is mild.  R leg hurts when starting to get sick      Problem history:       MOG - neg     Balance is off and tends to fall going downstairs.  Sensation has improved.       IUD mirena.      Pt reports over the last two weeks developing ascending numbness.  Started in feet. Progressed to T10.  Last few days developed saddle anesthesia.  LE feel weak and shaking.       Miscarriage Jan 9.         Fatigue is moderate to severe.       Rash resolved with not going out in sun.       Problem history:     Developed ascending numbness from feet to rib cage over 3 weeks. Felt slightly clumsy.  Could not feel shoes or pants.  Decreased LT.       Sx resolved by March 2020.  Episodes of N and weakness in LE lasting for a minute at time.  Flexing head or back provokes sx.       Feet developed numbness after being gone for a few weeks. Recurred with HA.       Migraines     Flashing light in peripheral of OD.  Frequency once every two weeks.   Moderate intensity.  Last for 1 - 1.5 hours.  Located in band around head.  Quality is a dull ache.      Taking Sumatriptan but cannot tolerate.        Reviewed medical records:     New onset of B LE numbness starting in 11/2019.  Started in toes and ascended up LE.  L > R sx.  Sensation of walking on sand.  Evaluated at  ED and noted to have decreased sensation from waist down.       My review of films:     MRI thoracic spine - T7/8 cord lesion.    MRI Brain/cervical - 1/9/20 tiny T2 lesion R PVWM probable venous anomaly, no cord lesions      "  4/6/20 reported HA 5 times a week.  Severe intensity, N, photophobia,      Evaluation by Dr Navarro NMO, SSA/SSB, HIV, RPR, ACE, ANCA neg  Recommended monitoring for second demyelinating lesion.        Objective   Vital Signs:  /56   Pulse 112   Ht 162.6 cm (64.02\")   Wt 54.4 kg (120 lb)   SpO2 98%   BMI 20.59 kg/m²   Estimated body mass index is 20.59 kg/m² as calculated from the following:    Height as of this encounter: 162.6 cm (64.02\").    Weight as of this encounter: 54.4 kg (120 lb).       BMI is within normal parameters. No other follow-up for BMI required.    Neurologic Exam     Mental Status   Oriented to person, place, and time.   Follows 3 step commands.   Attention: normal. Concentration: normal.   Speech: speech is normal   Level of consciousness: alert  Knowledge: good and consistent with education.   Normal comprehension.     Cranial Nerves   Cranial nerves II through XII intact.     CN II   Visual fields full to confrontation.   Visual acuity: normal  Right visual field deficit: none  Left visual field deficit: none     CN III, IV, VI   Pupils are equal, round, and reactive to light.  Extraocular motions are normal.   Right pupil: Accommodation: intact.   Left pupil: Accommodation: intact.   CN III: no CN III palsy  CN VI: no CN VI palsy  Nystagmus: none   Diplopia: none  Ophthalmoparesis: none  Upgaze: normal  Downgaze: normal  Conjugate gaze: present    CN V   Facial sensation intact.   Right corneal reflex: normal  Left corneal reflex: normal    CN VII   Facial expression full, symmetric.   Right facial weakness: none  Left facial weakness: none    CN VIII   Hearing: intact    CN IX, X   Palate: symmetric  Right gag reflex: normal  Left gag reflex: normal    CN XI   Right sternocleidomastoid strength: normal  Left sternocleidomastoid strength: normal    CN XII   CN XII normal.   Tongue: not atrophic  Fasciculations: absent  Tongue deviation: none    Motor Exam   Muscle bulk: " normal  Overall muscle tone: normal    Strength   Strength 5/5 throughout.   Right biceps: 5/5  Left biceps: 5/5  Right triceps: 5/5  Left triceps: 5/5  Right interossei: 5/5  Left interossei: 5/5  Right quadriceps: 5/5  Left quadriceps: 5/5  Right anterior tibial: 5/5  Left anterior tibial: 5/5  Right posterior tibial: 5/5  Left posterior tibial: 5/5    Sensory Exam   Light touch normal.   Left arm light touch: normal    Gait, Coordination, and Reflexes     Gait  Gait: normal    Coordination   Romberg: positive  Finger to nose coordination: normal  Heel to shin coordination: normal    Tremor   Resting tremor: absent  Intention tremor: absent  Action tremor: absent    Reflexes   Reflexes 2+ except as noted.   Right brachioradialis: 2+  Left brachioradialis: 2+  Right biceps: 2+  Left biceps: 2+  Right triceps: 2+  Left triceps: 2+  Right patellar: 2+  Left patellar: 2+  Right achilles: 2+  Left achilles: 2+  Right : 2+  Left : 2+Truncal sway        Physical Exam  Eyes:      Extraocular Movements: EOM normal.      Pupils: Pupils are equal, round, and reactive to light.   Neurological:      Mental Status: She is oriented to person, place, and time.      Cranial Nerves: Cranial nerves 2-12 are intact.      Motor: Motor strength is normal.     Coordination: Romberg Test abnormal. Finger-Nose-Finger Test and Heel to Shin Test normal.      Gait: Gait is intact.      Deep Tendon Reflexes:      Reflex Scores:       Tricep reflexes are 2+ on the right side and 2+ on the left side.       Bicep reflexes are 2+ on the right side and 2+ on the left side.       Brachioradialis reflexes are 2+ on the right side and 2+ on the left side.       Patellar reflexes are 2+ on the right side and 2+ on the left side.       Achilles reflexes are 2+ on the right side and 2+ on the left side.  Psychiatric:         Speech: Speech normal.        Result Review :                     Assessment and Plan     Diagnoses and all orders for  this visit:    1. Multiple sclerosis (Primary)  Assessment & Plan:  Continue Ocrevus         Orders:  -     Armodafinil (Nuvigil) 250 MG tablet; Take 1 tablet by mouth Daily for 180 days.  Dispense: 30 tablet; Refill: 5    2. Neuropathic pain    3. Periodic headache syndrome, not intractable    4. Chronic fatigue  Assessment & Plan:  Start Nuvigil                Follow Up     No follow-ups on file.  Patient was given instructions and counseling regarding her condition or for health maintenance advice. Please see specific information pulled into the AVS if appropriate.

## 2024-03-05 ENCOUNTER — PRIOR AUTHORIZATION (OUTPATIENT)
Dept: NEUROLOGY | Facility: CLINIC | Age: 24
End: 2024-03-05
Payer: COMMERCIAL

## 2024-03-05 NOTE — TELEPHONE ENCOUNTER
[place]: [id number]  Armodafinil 250MG tablets    Outcome  Approved today  CaseId:[id number];Status:Approved;Review Type:Prior Auth;Coverage Start Date:02/04/2024;Coverage End Date:03/05/2025;  Authorization Expiration Date: 3/4/2025

## 2024-03-20 ENCOUNTER — PATIENT MESSAGE (OUTPATIENT)
Dept: NEUROLOGY | Facility: CLINIC | Age: 24
End: 2024-03-20
Payer: COMMERCIAL

## 2024-03-20 NOTE — TELEPHONE ENCOUNTER
From: Trisha Payne  To: Leighton Ty  Sent: 3/20/2024 1:56 PM EDT  Subject: Armodafinil 250mg    Hey! Could we possibly lower the dose of my nuvigil, or would i possibly be able to split the pill in half? I reacted a little too much too it. My tremor increased, hr increased, I was inattentive in class and unable to sit still.

## 2024-06-11 ENCOUNTER — INFUSION (OUTPATIENT)
Dept: ONCOLOGY | Facility: HOSPITAL | Age: 24
End: 2024-06-11
Payer: COMMERCIAL

## 2024-06-11 VITALS
SYSTOLIC BLOOD PRESSURE: 102 MMHG | HEART RATE: 86 BPM | TEMPERATURE: 98.6 F | RESPIRATION RATE: 16 BRPM | DIASTOLIC BLOOD PRESSURE: 63 MMHG

## 2024-06-11 DIAGNOSIS — G35 MULTIPLE SCLEROSIS: Primary | ICD-10-CM

## 2024-06-11 PROCEDURE — 96365 THER/PROPH/DIAG IV INF INIT: CPT

## 2024-06-11 PROCEDURE — 25810000003 SODIUM CHLORIDE 0.9 % SOLUTION: Performed by: NURSE PRACTITIONER

## 2024-06-11 PROCEDURE — 96366 THER/PROPH/DIAG IV INF ADDON: CPT

## 2024-06-11 PROCEDURE — 96413 CHEMO IV INFUSION 1 HR: CPT

## 2024-06-11 PROCEDURE — 25810000003 SODIUM CHLORIDE 0.9 % SOLUTION 500 ML FLEX CONT: Performed by: NURSE PRACTITIONER

## 2024-06-11 PROCEDURE — 96415 CHEMO IV INFUSION ADDL HR: CPT

## 2024-06-11 PROCEDURE — 25010000002 DIPHENHYDRAMINE PER 50 MG: Performed by: NURSE PRACTITIONER

## 2024-06-11 PROCEDURE — 96374 THER/PROPH/DIAG INJ IV PUSH: CPT

## 2024-06-11 PROCEDURE — 96375 TX/PRO/DX INJ NEW DRUG ADDON: CPT

## 2024-06-11 PROCEDURE — 25010000002 OCRELIZUMAB 300 MG/10ML SOLUTION 300 MG VIAL: Performed by: NURSE PRACTITIONER

## 2024-06-11 PROCEDURE — 25010000002 METHYLPREDNISOLONE PER 125 MG: Performed by: NURSE PRACTITIONER

## 2024-06-11 RX ORDER — METHYLPREDNISOLONE SODIUM SUCCINATE 125 MG/2ML
100 INJECTION, POWDER, LYOPHILIZED, FOR SOLUTION INTRAMUSCULAR; INTRAVENOUS ONCE
Status: COMPLETED | OUTPATIENT
Start: 2024-06-11 | End: 2024-06-11

## 2024-06-11 RX ORDER — IBUPROFEN 400 MG/1
400 TABLET ORAL EVERY 6 HOURS PRN
OUTPATIENT
Start: 2024-12-10

## 2024-06-11 RX ORDER — DIPHENHYDRAMINE HYDROCHLORIDE 50 MG/ML
50 INJECTION INTRAMUSCULAR; INTRAVENOUS AS NEEDED
OUTPATIENT
Start: 2024-12-10

## 2024-06-11 RX ORDER — DIPHENHYDRAMINE HYDROCHLORIDE 50 MG/ML
25 INJECTION INTRAMUSCULAR; INTRAVENOUS ONCE
Status: COMPLETED | OUTPATIENT
Start: 2024-06-11 | End: 2024-06-11

## 2024-06-11 RX ORDER — ACETAMINOPHEN 325 MG/1
650 TABLET ORAL EVERY 6 HOURS PRN
OUTPATIENT
Start: 2024-12-10

## 2024-06-11 RX ORDER — ACETAMINOPHEN 325 MG/1
650 TABLET ORAL ONCE
OUTPATIENT
Start: 2024-12-10

## 2024-06-11 RX ORDER — METHYLPREDNISOLONE SODIUM SUCCINATE 125 MG/2ML
100 INJECTION, POWDER, LYOPHILIZED, FOR SOLUTION INTRAMUSCULAR; INTRAVENOUS ONCE
OUTPATIENT
Start: 2024-12-10

## 2024-06-11 RX ORDER — FAMOTIDINE 10 MG/ML
20 INJECTION, SOLUTION INTRAVENOUS AS NEEDED
OUTPATIENT
Start: 2024-12-10

## 2024-06-11 RX ORDER — DIPHENHYDRAMINE HYDROCHLORIDE 50 MG/ML
25 INJECTION INTRAMUSCULAR; INTRAVENOUS ONCE
OUTPATIENT
Start: 2024-12-10

## 2024-06-11 RX ORDER — SODIUM CHLORIDE 9 MG/ML
250 INJECTION, SOLUTION INTRAVENOUS ONCE
Status: COMPLETED | OUTPATIENT
Start: 2024-06-11 | End: 2024-06-11

## 2024-06-11 RX ORDER — ACETAMINOPHEN 325 MG/1
650 TABLET ORAL ONCE
Status: COMPLETED | OUTPATIENT
Start: 2024-06-11 | End: 2024-06-11

## 2024-06-11 RX ORDER — SODIUM CHLORIDE 9 MG/ML
250 INJECTION, SOLUTION INTRAVENOUS ONCE
OUTPATIENT
Start: 2024-12-10

## 2024-06-11 RX ADMIN — OCRELIZUMAB 600 MG: 300 INJECTION INTRAVENOUS at 09:15

## 2024-06-11 RX ADMIN — SODIUM CHLORIDE 250 ML: 9 INJECTION, SOLUTION INTRAVENOUS at 08:38

## 2024-06-11 RX ADMIN — DIPHENHYDRAMINE HYDROCHLORIDE 25 MG: 50 INJECTION INTRAMUSCULAR; INTRAVENOUS at 08:39

## 2024-06-11 RX ADMIN — METHYLPREDNISOLONE SODIUM SUCCINATE 100 MG: 125 INJECTION, POWDER, FOR SOLUTION INTRAMUSCULAR; INTRAVENOUS at 08:39

## 2024-06-11 RX ADMIN — ACETAMINOPHEN 650 MG: 325 TABLET, FILM COATED ORAL at 08:39
